# Patient Record
Sex: FEMALE | Race: WHITE | Employment: FULL TIME | URBAN - METROPOLITAN AREA
[De-identification: names, ages, dates, MRNs, and addresses within clinical notes are randomized per-mention and may not be internally consistent; named-entity substitution may affect disease eponyms.]

---

## 2017-01-25 RX ORDER — INSULIN ASPART 100 [IU]/ML
INJECTION, SOLUTION INTRAVENOUS; SUBCUTANEOUS
Qty: 2 PEN | Refills: 2 | Status: SHIPPED | OUTPATIENT
Start: 2017-01-25 | End: 2017-06-26 | Stop reason: ALTCHOICE

## 2017-01-25 RX ORDER — PEN NEEDLE, DIABETIC 30 GX3/16"
NEEDLE, DISPOSABLE MISCELLANEOUS
Qty: 2 PACKAGE | Refills: 1 | Status: SHIPPED | OUTPATIENT
Start: 2017-01-25 | End: 2017-06-26 | Stop reason: ALTCHOICE

## 2017-03-29 ENCOUNTER — TELEPHONE (OUTPATIENT)
Dept: FAMILY MEDICINE CLINIC | Age: 42
End: 2017-03-29

## 2017-03-29 NOTE — TELEPHONE ENCOUNTER
Patient is currently taking Novolog, as of April 1st Humalog or Humulin will be the preferred drug, per [facility]

## 2017-04-04 RX ORDER — INSULIN ASPART 100 [IU]/ML
INJECTION, SOLUTION INTRAVENOUS; SUBCUTANEOUS
Qty: 30 ML | Refills: 3 | Status: SHIPPED | OUTPATIENT
Start: 2017-04-04 | End: 2017-06-26 | Stop reason: ALTCHOICE

## 2017-04-12 RX ORDER — AMITRIPTYLINE HYDROCHLORIDE 25 MG/1
TABLET, FILM COATED ORAL
Qty: 30 TAB | Refills: 4 | Status: SHIPPED | OUTPATIENT
Start: 2017-04-12 | End: 2017-09-14 | Stop reason: SDUPTHER

## 2017-06-26 ENCOUNTER — OFFICE VISIT (OUTPATIENT)
Dept: FAMILY MEDICINE CLINIC | Age: 42
End: 2017-06-26

## 2017-06-26 VITALS
SYSTOLIC BLOOD PRESSURE: 124 MMHG | OXYGEN SATURATION: 99 % | TEMPERATURE: 98.1 F | WEIGHT: 195.2 LBS | RESPIRATION RATE: 28 BRPM | DIASTOLIC BLOOD PRESSURE: 86 MMHG | HEIGHT: 64 IN | HEART RATE: 85 BPM | BODY MASS INDEX: 33.32 KG/M2

## 2017-06-26 DIAGNOSIS — I42.9 CARDIOMYOPATHY, UNSPECIFIED TYPE (HCC): ICD-10-CM

## 2017-06-26 DIAGNOSIS — I10 ESSENTIAL HYPERTENSION, BENIGN: ICD-10-CM

## 2017-06-26 DIAGNOSIS — E10.9 TYPE 1 DIABETES MELLITUS WITHOUT COMPLICATION (HCC): ICD-10-CM

## 2017-06-26 DIAGNOSIS — E03.9 UNSPECIFIED HYPOTHYROIDISM: ICD-10-CM

## 2017-06-26 DIAGNOSIS — Z00.00 ANNUAL PHYSICAL EXAM: Primary | ICD-10-CM

## 2017-06-26 LAB
GLUCOSE POC: 163 MG/DL
HBA1C MFR BLD HPLC: 8.7 %

## 2017-06-26 RX ORDER — INSULIN LISPRO 100 [IU]/ML
INJECTION, SOLUTION INTRAVENOUS; SUBCUTANEOUS
Qty: 3 VIAL | Refills: 5
Start: 2017-06-26 | End: 2017-10-10 | Stop reason: SDUPTHER

## 2017-06-26 NOTE — PROGRESS NOTES
Subjective:   43 y.o. female for Well Woman Check. Her gyne and breast care is done elsewhere by her Ob-Gyne physician. Patient Active Problem List   Diagnosis Code    Type 1 diabetes mellitus (HCC) E10.9    Essential hypertension, benign I10    Unspecified hypothyroidism E03.9    Cardiomyopathy ([facility] I42.9    Gastric polyp K31.7     Patient Active Problem List    Diagnosis Date Noted    Gastric polyp 01/02/2015    Type 1 diabetes mellitus ([facility] 12/23/2011    Essential hypertension, benign 12/23/2011    Unspecified hypothyroidism 12/23/2011    Cardiomyopathy ([facility] 12/23/2011     Current Outpatient Prescriptions   Medication Sig Dispense Refill    [facility]       insulin lispro (HUMALOG) 100 unit/mL injection Inject 80 units via pump daily 3 Vial 5    amitriptyline (ELAVIL) 25 mg tablet TAKE ONE TABLET BY MOUTH NIGHTLY AT BEDTIME 30 Tab 4    ALPRAZolam (XANAX) 0.25 mg tablet Take 1 Tab by mouth two (2) times daily as needed for Anxiety. Max Daily Amount: 0.5 mg. 50 Tab 1    metoprolol (LOPRESSOR) 50 mg tablet Take  by mouth nightly.  pravastatin (PRAVACHOL) 40 mg tablet Take 40 mg by mouth nightly.  aspirin delayed-release 81 mg tablet Take  by mouth daily.  levothyroxine (SYNTHROID) 112 mcg tablet Take 125 mcg by mouth Daily (before breakfast).  norethindrone (JOLIE) 0.35 mg tablet Take  by mouth. No Known Allergies  Past Medical History:   Diagnosis Date    Cardiomyopathy ([facility] 12/23/2011    Diabetes ([facility]     uses insulin pump (has had 7-8 years ago)    Essential hypertension, benign 12/23/2011    Hypercholesterolemia     Hypertension     Ill-defined condition     Cardiomyopathy    [facility]     Last seizure 8 yrs.  ago    Type I (juvenile type) diabetes mellitus without mention of complication, not stated as uncontrolled 12/23/2011    Unspecified hypothyroidism 12/23/2011     Past Surgical History:   Procedure Laterality Date    HX  SECTION      HX GASTRECTOMY  1/2/15    Dr Pascale Green    HX ORTHOPAEDIC      Left foot    HX ORTHOPAEDIC      Lumbar disc     Family History   Problem Relation Age of Onset    Diabetes Mother     Hypertension Mother     Elevated Lipids Mother     Heart Disease Father     Cancer Maternal Grandmother      Social History   Substance Use Topics    Smoking status: Never Smoker    Smokeless tobacco: Never Used    Alcohol use No             Specific concerns today: check up,changing endocrinology    Review of Systems  Constitutional: negative  Eyes: negative  Ears, nose, mouth, throat, and face: negative  Respiratory: negative  Cardiovascular: negative  Gastrointestinal: negative  Genitourinary:negative  Musculoskeletal:negative    Objective:   Blood pressure 124/86, pulse 85, temperature 98.1 °F (36.7 °C), temperature source Oral, resp. rate 28, height 5' 4\" (1.626 m), weight 195 lb 3.2 oz (88.5 kg), SpO2 99 %.    Physical Examination:   General appearance - alert, well appearing, and in no distress  Mental status - alert, oriented to person, place, and time  Eyes - pupils equal and reactive, extraocular eye movements intact  Ears - bilateral TM's and external ear canals normal  Nose - normal and patent, no erythema, discharge or polyps  Mouth - mucous membranes moist, pharynx normal without lesions  Neck - supple, no significant adenopathy, carotids upstroke normal bilaterally, no bruits, thyroid exam: thyroid is normal in size without nodules or tenderness  Lymphatics - no palpable lymphadenopathy, no hepatosplenomegaly  Chest - clear to auscultation, no wheezes, rales or rhonchi, symmetric air entry  Heart - normal rate, regular rhythm, normal S1, S2, no murmurs, rubs, clicks or gallops  Abdomen - soft, nontender, nondistended, no masses or organomegaly  Neurological - alert, oriented, normal speech, no focal findings or movement disorder noted  Musculoskeletal - no joint tenderness, deformity or swelling  Extremities - peripheral pulses normal, no pedal edema, no clubbing or cyanosis  Skin - normal coloration and turgor, no rashes, no suspicious skin lesions noted     Assessment/Plan:     continue present plan, routine labs ordered, call if any problems  Joya was seen today for diabetes, hypertension and cholesterol problem. Diagnoses and all orders for this visit:    Annual physical exam    Type 1 diabetes mellitus without complication ([facility]  -     AMB POC HEMOGLOBIN A1C  -     LIPID PANEL  -     AMB POC GLUCOSE, QUANTITATIVE, BLOOD    Cardiomyopathy, unspecified type    Essential hypertension, benign  -     METABOLIC PANEL, COMPREHENSIVE    Unspecified hypothyroidism    Other orders  -     insulin lispro (HUMALOG) 100 unit/mL injection; Inject 80 units via pump daily      Follow-up Disposition:  Return in about 6 months (around 12/26/2017).

## 2017-06-26 NOTE — MR AVS SNAPSHOT
Visit Information Date & Time Provider Department Dept. Phone Encounter #  
 6/26/2017  9:00 AM Charan Car [phone] [id number] Follow-up Instructions Return in about 6 months (around 12/26/2017). Upcoming Health Maintenance Date Due  
 FOOT EXAM Q1 2/21/1985 MICROALBUMIN Q1 2/21/1985 PAP AKA CERVICAL CYTOLOGY 2/21/1996 EYE EXAM RETINAL OR DILATED Q1 4/8/2012 HEMOGLOBIN A1C Q6M 12/23/2015 LIPID PANEL Q1 6/23/2016 INFLUENZA AGE 9 TO ADULT 8/1/2017 COLONOSCOPY 10/29/2023 DTaP/Tdap/Td series (2 - Td) 6/26/2027 Allergies as of 6/26/2017  Review Complete On: 6/26/2017 By: Janay Paz No Known Allergies Current Immunizations  Never Reviewed Name Date Influenza Vaccine 11/1/2014 Pneumococcal Polysaccharide (PPSV-23) 1/5/2015 11:32 AM  
  
 Not reviewed this visit You Were Diagnosed With   
  
 Codes Comments Annual physical exam    -  Primary ICD-10-CM: Z00.00 ICD-9-CM: V70.0 Type 1 diabetes mellitus without complication (HCC)     NICO-89-MH: E10.9 ICD-9-CM: 250.01 Cardiomyopathy, unspecified type     ICD-10-CM: I42.9 ICD-9-CM: 425.4 Essential hypertension, benign     ICD-10-CM: I10 
ICD-9-CM: 401.1 Unspecified hypothyroidism     ICD-10-CM: E03.9 ICD-9-CM: 865. 9 Vitals BP Pulse Temp Resp Height(growth percentile) Weight(growth percentile) 124/86 (BP 1 Location: Left arm, BP Patient Position: Sitting) 85 98.1 °F (36.7 °C) (Oral) 28 5' 4\" (1.626 m) 195 lb 3.2 oz (88.5 kg) SpO2 BMI OB Status Smoking Status 99% 33.51 kg/m2 Having regular periods Never Smoker Vitals History BMI and BSA Data Body Mass Index Body Surface Area  
 33.51 kg/m 2 2 m 2 Preferred Pharmacy Pharmacy Name Phone [place] [id number] [facility]akes [phone] Your Updated Medication List  
  
   
 This list is accurate as of: 6/26/17  9:29 AM.  Always use your most recent med list.  
  
  
  
  
 ALPRAZolam 0.25 mg tablet Commonly known as:  [place] Take 1 Tab by mouth two (2) times daily as needed for Anxiety. Max Daily Amount: 0.5 mg.  
  
 amitriptyline 25 mg tablet Commonly known as:  ELAVIL TAKE ONE TABLET BY MOUTH NIGHTLY AT BEDTIME  
  
 aspirin delayed-release 81 mg tablet Take  by mouth daily. JOLIE 0.35 mg Tab Generic drug:  norethindrone Take  by mouth. insulin lispro 100 unit/mL injection Commonly known as:  HUMALOG Inject 80 units via pump daily IP [facility] INSULIN LISPRO (HUMALOG) FOR PUMP  
  
 metoprolol tartrate 50 mg tablet Commonly known as:  LOPRESSOR Take  by mouth nightly. pravastatin 40 mg tablet Commonly known as:  PRAVACHOL Take 40 mg by mouth nightly. SYNTHROID 112 mcg tablet Generic drug:  levothyroxine Take 125 mcg by mouth Daily (before breakfast). We Performed the Following AMB POC GLUCOSE, QUANTITATIVE, BLOOD [73661 CPT(R)] AMB POC HEMOGLOBIN A1C [54801 CPT(R)] LIPID PANEL [78338 CPT(R)] METABOLIC PANEL, COMPREHENSIVE [80177 CPT(R)] Follow-up Instructions Return in about 6 months (around 12/26/2017). Introducing [facility]! Dear Dawit Spangler: Thank you for requesting a [employer] account. Our records indicate that you already have an active Zaz[employer] account. You can access your account anytime at [email or web address] Did you know that you can access your hospital and ER discharge instructions at any time in Zazuba? You can also review all of your test results from your hospital stay or ER visit. Additional Information If you have questions, please visit the Frequently Asked Questions section of the Zazuba website at [email or web address] Remember, Zazuba is NOT to be used for urgent needs. For medical emergencies, dial 911. Now available from your iPhone and Android! Please provide this summary of care documentation to your next provider. Your primary care clinician is listed as Clarke Colon. If you have any questions after today's visit, please call [phone].

## 2017-06-27 LAB
ALBUMIN SERPL-MCNC: 4.1 G/DL (ref 3.5–5.5)
ALBUMIN/GLOB SERPL: 1.5 {RATIO} (ref 1.2–2.2)
ALP SERPL-CCNC: 123 IU/L (ref 39–117)
ALT SERPL-CCNC: 11 IU/L (ref 0–32)
AST SERPL-CCNC: 12 IU/L (ref 0–40)
BILIRUB SERPL-MCNC: 0.4 MG/DL (ref 0–1.2)
BUN SERPL-MCNC: 16 MG/DL (ref 6–24)
BUN/CREAT SERPL: 20 (ref 9–23)
CALCIUM SERPL-MCNC: 9 MG/DL (ref 8.7–10.2)
CHLORIDE SERPL-SCNC: 99 MMOL/L (ref 96–106)
CHOLEST SERPL-MCNC: 150 MG/DL (ref 100–199)
CO2 SERPL-SCNC: 24 MMOL/L (ref 18–29)
CREAT SERPL-MCNC: 0.82 MG/DL (ref 0.57–1)
GLOBULIN SER CALC-MCNC: 2.8 G/DL (ref 1.5–4.5)
GLUCOSE SERPL-MCNC: 152 MG/DL (ref 65–99)
HDLC SERPL-MCNC: 37 MG/DL
INTERPRETATION, 910389: NORMAL
LDLC SERPL CALC-MCNC: 87 MG/DL (ref 0–99)
POTASSIUM SERPL-SCNC: 4.3 MMOL/L (ref 3.5–5.2)
PROT SERPL-MCNC: 6.9 G/DL (ref 6–8.5)
SODIUM SERPL-SCNC: 139 MMOL/L (ref 134–144)
TRIGL SERPL-MCNC: 128 MG/DL (ref 0–149)
VLDLC SERPL CALC-MCNC: 26 MG/DL (ref 5–40)

## 2017-08-18 RX ORDER — AZITHROMYCIN 250 MG/1
TABLET, FILM COATED ORAL
Qty: 6 TAB | Refills: 0 | Status: SHIPPED | OUTPATIENT
Start: 2017-08-18 | End: 2017-08-23

## 2017-08-18 NOTE — TELEPHONE ENCOUNTER
----- Message from Whit Cole sent at 8/18/2017  7:52 AM EDT -----  Regarding: Dr Becky Libman  Pt would like to get a [employer] call in for a sinus infection, please [facility] into [facility] [phone], pt call be reach at [phone].

## 2017-09-14 RX ORDER — AMITRIPTYLINE HYDROCHLORIDE 25 MG/1
TABLET, FILM COATED ORAL
Qty: 30 TAB | Refills: 4 | Status: SHIPPED | OUTPATIENT
Start: 2017-09-14 | End: 2018-01-03 | Stop reason: SDUPTHER

## 2017-10-10 RX ORDER — INSULIN LISPRO 100 [IU]/ML
INJECTION, SOLUTION INTRAVENOUS; SUBCUTANEOUS
Qty: 30 ML | Refills: 3 | Status: SHIPPED | OUTPATIENT
Start: 2017-10-10 | End: 2018-04-07 | Stop reason: SDUPTHER

## 2017-10-11 RX ORDER — INSULIN LISPRO 100 [IU]/ML
INJECTION, SOLUTION INTRAVENOUS; SUBCUTANEOUS
Qty: 3 VIAL | Refills: 5
Start: 2017-10-11 | End: 2019-06-03 | Stop reason: SDUPTHER

## 2017-10-11 NOTE — TELEPHONE ENCOUNTER
From: Joya Beard  To: Marcus Jean Baptiste MD  Sent: 10/10/2017 4:14 PM EDT  Subject: Medication Renewal Request    Original authorizing provider: Marcus Jean Baptiste MD    Chen Demi would like a refill of the following medications:  insulin lispro (HUMALOG) 100 unit/mL injection Marcus Jean Baptiste MD]    Preferred pharmacy: [address]    Comment:  [facility] has sent a prescription request for Humalog four times and have not received a response. I am completely out of medicine and need this asap.

## 2018-01-03 RX ORDER — AMITRIPTYLINE HYDROCHLORIDE 25 MG/1
TABLET, FILM COATED ORAL
Qty: 90 TAB | Refills: 0 | Status: SHIPPED | OUTPATIENT
Start: 2018-01-03 | End: 2018-04-30 | Stop reason: SDUPTHER

## 2018-04-08 RX ORDER — INSULIN LISPRO 100 [IU]/ML
INJECTION, SOLUTION INTRAVENOUS; SUBCUTANEOUS
Qty: 90 ML | Refills: 1 | Status: SHIPPED | OUTPATIENT
Start: 2018-04-08 | End: 2018-09-17 | Stop reason: SDUPTHER

## 2018-04-30 RX ORDER — AMITRIPTYLINE HYDROCHLORIDE 25 MG/1
TABLET, FILM COATED ORAL
Qty: 90 TAB | Refills: 0 | Status: SHIPPED | OUTPATIENT
Start: 2018-04-30 | End: 2018-08-25 | Stop reason: SDUPTHER

## 2018-08-27 RX ORDER — AMITRIPTYLINE HYDROCHLORIDE 25 MG/1
TABLET, FILM COATED ORAL
Qty: 90 TAB | Refills: 0 | Status: SHIPPED | OUTPATIENT
Start: 2018-08-27 | End: 2019-03-25 | Stop reason: SDUPTHER

## 2018-09-17 RX ORDER — INSULIN LISPRO 100 [IU]/ML
INJECTION, SOLUTION INTRAVENOUS; SUBCUTANEOUS
Qty: 90 ML | Refills: 1 | Status: SHIPPED | OUTPATIENT
Start: 2018-09-17 | End: 2019-09-23 | Stop reason: SDUPTHER

## 2019-02-28 RX ORDER — AZITHROMYCIN 250 MG/1
TABLET, FILM COATED ORAL
Qty: 6 TAB | Refills: 0 | Status: SHIPPED | OUTPATIENT
Start: 2019-02-28 | End: 2019-03-05

## 2019-02-28 RX ORDER — PROMETHAZINE HYDROCHLORIDE AND DEXTROMETHORPHAN HYDROBROMIDE 6.25; 15 MG/5ML; MG/5ML
5 SYRUP ORAL
Qty: 180 ML | Refills: 0 | Status: SHIPPED | OUTPATIENT
Start: 2019-02-28 | End: 2019-03-07

## 2019-02-28 NOTE — TELEPHONE ENCOUNTER
Patient wants to be seen this week for a cough/ congestion/ dizziness.   Please give her a call @ [phone]

## 2019-03-21 NOTE — TELEPHONE ENCOUNTER
Last Visit: 11/22/17  Next Appt: none  Previous Refill Encounter: 8/27/18-90+0    Requested Prescriptions     Pending Prescriptions Disp Refills    amitriptyline (ELAVIL) 25 mg tablet 90 Tab 0     Sig: TAKE 1 TABLET BY MOUTH EVERY DAY AT BEDTIME

## 2019-03-22 RX ORDER — AMITRIPTYLINE HYDROCHLORIDE 25 MG/1
TABLET, FILM COATED ORAL
Qty: 90 TAB | Refills: 0 | OUTPATIENT
Start: 2019-03-22

## 2019-03-28 RX ORDER — AMITRIPTYLINE HYDROCHLORIDE 25 MG/1
TABLET, FILM COATED ORAL
Qty: 90 TAB | Refills: 0 | Status: SHIPPED | OUTPATIENT
Start: 2019-03-28 | End: 2019-06-19 | Stop reason: SDUPTHER

## 2019-06-03 RX ORDER — PRAVASTATIN SODIUM 40 MG/1
40 TABLET ORAL
Qty: 90 TAB | Refills: 1 | Status: SHIPPED | OUTPATIENT
Start: 2019-06-03 | End: 2019-12-14 | Stop reason: SDUPTHER

## 2019-06-03 RX ORDER — INSULIN LISPRO 100 [IU]/ML
INJECTION, SOLUTION INTRAVENOUS; SUBCUTANEOUS
Qty: 3 VIAL | Refills: 5
Start: 2019-06-03 | End: 2020-02-01 | Stop reason: SDUPTHER

## 2019-06-03 NOTE — TELEPHONE ENCOUNTER
Patient states that she needs a couple of RX refills HUMALOG U-100 INSULIN 100 unit/mL injection      pravastatin (PRAVACHOL) 40 mg tablet she can be reached @ [phone]

## 2019-06-19 RX ORDER — AMITRIPTYLINE HYDROCHLORIDE 25 MG/1
TABLET, FILM COATED ORAL
Qty: 90 TAB | Refills: 0 | Status: SHIPPED | OUTPATIENT
Start: 2019-06-19 | End: 2019-10-01 | Stop reason: SDUPTHER

## 2019-06-28 ENCOUNTER — HOSPITAL ENCOUNTER (EMERGENCY)
Age: 44
Discharge: HOME | End: 2019-06-28
Attending: EMERGENCY MEDICINE
Payer: SELF-PAY

## 2019-06-28 VITALS
WEIGHT: 199.08 LBS | TEMPERATURE: 97.7 F | RESPIRATION RATE: 17 BRPM | HEIGHT: 64 IN | DIASTOLIC BLOOD PRESSURE: 83 MMHG | BODY MASS INDEX: 33.99 KG/M2 | HEART RATE: 67 BPM | OXYGEN SATURATION: 96 % | SYSTOLIC BLOOD PRESSURE: 137 MMHG

## 2019-06-28 DIAGNOSIS — K29.90 GASTRITIS AND DUODENITIS: Primary | ICD-10-CM

## 2019-06-28 LAB
ALBUMIN SERPL-MCNC: 3.7 G/DL (ref 3.5–5)
ALBUMIN/GLOB SERPL: 0.9 {RATIO} (ref 1.1–2.2)
ALP SERPL-CCNC: 119 U/L (ref 45–117)
ALT SERPL-CCNC: 20 U/L (ref 12–78)
ANION GAP SERPL CALC-SCNC: 5 MMOL/L (ref 5–15)
AST SERPL-CCNC: 19 U/L (ref 15–37)
BASOPHILS # BLD: 0.1 K/UL (ref 0–0.1)
BASOPHILS NFR BLD: 1 % (ref 0–1)
BILIRUB SERPL-MCNC: 0.4 MG/DL (ref 0.2–1)
BUN SERPL-MCNC: 18 MG/DL (ref 6–20)
BUN/CREAT SERPL: 21 (ref 12–20)
CALCIUM SERPL-MCNC: 8.8 MG/DL (ref 8.5–10.1)
CHLORIDE SERPL-SCNC: 108 MMOL/L (ref 97–108)
CO2 SERPL-SCNC: 27 MMOL/L (ref 21–32)
CREAT SERPL-MCNC: 0.84 MG/DL (ref 0.55–1.02)
DIFFERENTIAL METHOD BLD: NORMAL
EOSINOPHIL # BLD: 0.3 K/UL (ref 0–0.4)
EOSINOPHIL NFR BLD: 4 % (ref 0–7)
ERYTHROCYTE [DISTWIDTH] IN BLOOD BY AUTOMATED COUNT: 12.8 % (ref 11.5–14.5)
GLOBULIN SER CALC-MCNC: 3.9 G/DL (ref 2–4)
GLUCOSE SERPL-MCNC: 77 MG/DL (ref 65–100)
HCT VFR BLD AUTO: 41.9 % (ref 35–47)
HGB BLD-MCNC: 14.1 G/DL (ref 11.5–16)
IMM GRANULOCYTES # BLD AUTO: 0 K/UL (ref 0–0.04)
IMM GRANULOCYTES NFR BLD AUTO: 0 % (ref 0–0.5)
LIPASE SERPL-CCNC: 296 U/L (ref 73–393)
LYMPHOCYTES # BLD: 3.2 K/UL (ref 0.8–3.5)
LYMPHOCYTES NFR BLD: 33 % (ref 12–49)
MCH RBC QN AUTO: 27.4 PG (ref 26–34)
MCHC RBC AUTO-ENTMCNC: 33.7 G/DL (ref 30–36.5)
MCV RBC AUTO: 81.4 FL (ref 80–99)
MONOCYTES # BLD: 0.7 K/UL (ref 0–1)
MONOCYTES NFR BLD: 7 % (ref 5–13)
NEUTS SEG # BLD: 5.4 K/UL (ref 1.8–8)
NEUTS SEG NFR BLD: 55 % (ref 32–75)
NRBC # BLD: 0 K/UL (ref 0–0.01)
NRBC BLD-RTO: 0 PER 100 WBC
PLATELET # BLD AUTO: 277 K/UL (ref 150–400)
PMV BLD AUTO: 9.5 FL (ref 8.9–12.9)
POTASSIUM SERPL-SCNC: 3.7 MMOL/L (ref 3.5–5.1)
PROT SERPL-MCNC: 7.6 G/DL (ref 6.4–8.2)
RBC # BLD AUTO: 5.15 M/UL (ref 3.8–5.2)
SODIUM SERPL-SCNC: 140 MMOL/L (ref 136–145)
WBC # BLD AUTO: 9.7 K/UL (ref 3.6–11)

## 2019-06-28 PROCEDURE — 74011250636 HC RX REV CODE- 250/636

## 2019-06-28 PROCEDURE — 85025 COMPLETE CBC W/AUTO DIFF WBC: CPT

## 2019-06-28 PROCEDURE — 74011250637 HC RX REV CODE- 250/637: Performed by: EMERGENCY MEDICINE

## 2019-06-28 PROCEDURE — 74011000250 HC RX REV CODE- 250: Performed by: EMERGENCY MEDICINE

## 2019-06-28 PROCEDURE — 80053 COMPREHEN METABOLIC PANEL: CPT

## 2019-06-28 PROCEDURE — 96374 THER/PROPH/DIAG INJ IV PUSH: CPT

## 2019-06-28 PROCEDURE — 83690 ASSAY OF LIPASE: CPT

## 2019-06-28 PROCEDURE — 36415 COLL VENOUS BLD VENIPUNCTURE: CPT

## 2019-06-28 PROCEDURE — 99283 EMERGENCY DEPT VISIT LOW MDM: CPT

## 2019-06-28 RX ORDER — FENTANYL CITRATE 50 UG/ML
50 INJECTION, SOLUTION INTRAMUSCULAR; INTRAVENOUS
Status: COMPLETED | OUTPATIENT
Start: 2019-06-28 | End: 2019-06-28

## 2019-06-28 RX ORDER — LIDOCAINE HYDROCHLORIDE 20 MG/ML
SOLUTION OROPHARYNGEAL
Status: DISCONTINUED
Start: 2019-06-28 | End: 2019-06-28 | Stop reason: HOSPADM

## 2019-06-28 RX ORDER — SUCRALFATE 1 G/1
1 TABLET ORAL 4 TIMES DAILY
Qty: 30 TAB | Refills: 0 | Status: SHIPPED | OUTPATIENT
Start: 2019-06-28 | End: 2019-09-23

## 2019-06-28 RX ORDER — OMEPRAZOLE 20 MG/1
20 CAPSULE, DELAYED RELEASE ORAL DAILY
Qty: 20 CAP | Refills: 0 | Status: SHIPPED | OUTPATIENT
Start: 2019-06-28 | End: 2019-09-23

## 2019-06-28 RX ORDER — FENTANYL CITRATE 50 UG/ML
INJECTION, SOLUTION INTRAMUSCULAR; INTRAVENOUS
Status: DISCONTINUED
Start: 2019-06-28 | End: 2019-06-28 | Stop reason: HOSPADM

## 2019-06-28 RX ADMIN — FENTANYL CITRATE 50 MCG: 50 INJECTION, SOLUTION INTRAMUSCULAR; INTRAVENOUS at 02:27

## 2019-06-28 RX ADMIN — LIDOCAINE HYDROCHLORIDE 40 ML: 20 SOLUTION ORAL; TOPICAL at 01:17

## 2019-06-28 NOTE — ED PROVIDER NOTES
EMERGENCY DEPARTMENT HISTORY AND PHYSICAL EXAM      Date: 6/28/2019  Patient Name: Miri Chino  Patient Age and Sex: 40 y.o. female     History of Presenting Illness     Chief Complaint   Patient presents with    Abdominal Pain     Pt presents to ED c/o abdominal pain, onset x 1 week ago. History Provided By: Patient    HPI: Miri Chino is a 72-year-old female with a history of partial gastrectomy presenting with epigastric pain. Patient states that for the past 2 days has been having sharp epigastric pain radiating to her back associated with nausea and belching. Denies any vomiting, diarrhea, constipation. Patient states that she has a history of polyp in her stomach and feels like it is that. States she also has a history of acid reflux but does not take anything because has not had issues with it for a long time. Pain is currently a 10 out of 10. There are no other complaints, changes, or physical findings at this time. PCP: Gonzalez Ulrich MD    No current facility-administered medications on file prior to encounter. Current Outpatient Medications on File Prior to Encounter   Medication Sig Dispense Refill    amitriptyline (ELAVIL) 25 mg tablet TAKE 1 TABLET BY MOUTH EVERYDAY AT BEDTIME 90 Tab 0    insulin lispro (HUMALOG) 100 unit/mL injection Inject 80 units via pump daily 3 Vial 5    pravastatin (PRAVACHOL) 40 mg tablet Take 1 Tab by mouth nightly. 90 Tab 1    HUMALOG U-100 INSULIN 100 unit/mL injection INJECT 80 UNITS VIA PUMP DAILY 90 mL 1    IP [facility] INSULIN LISPRO, HUMALOG, FOR PUMP       ALPRAZolam (XANAX) 0.25 mg tablet Take 1 Tab by mouth two (2) times daily as needed for Anxiety. Max Daily Amount: 0.5 mg. 50 Tab 1    metoprolol (LOPRESSOR) 50 mg tablet Take  by mouth nightly.  aspirin delayed-release 81 mg tablet Take  by mouth daily.  levothyroxine (SYNTHROID) 112 mcg tablet Take 125 mcg by mouth Daily (before breakfast).       norethindrone (JOLIE) 0.35 mg tablet Take  by mouth. Past History     Past Medical History:  Past Medical History:   Diagnosis Date    Cardiomyopathy ([address] 2011    Diabetes ([facility]     uses insulin pump (has had 7-8 years ago)    Essential hypertension, benign 2011    Hypercholesterolemia     Hypertension     Ill-defined condition     Cardiomyopathy    Seizures ([facility]     Last seizure 8 yrs. ago    Type I (juvenile type) diabetes mellitus without mention of complication, not stated as uncontrolled 2011    Unspecified hypothyroidism 2011       Past Surgical History:  Past Surgical History:   Procedure Laterality Date    HX  SECTION      HX GASTRECTOMY  1/2/15    Dr Timi Rabago      Left foot    HX ORTHOPAEDIC      Lumbar disc       Family History:  Family History   Problem Relation Age of Onset    Diabetes Mother     Hypertension Mother     Elevated Lipids Mother     Heart Disease Father     Cancer Maternal Grandmother        Social History:  Social History     Tobacco Use    Smoking status: Never Smoker    Smokeless tobacco: Never Used   Substance Use Topics    Alcohol use: No    Drug use: No       Allergies:  No Known Allergies      Review of Systems   Review of Systems   Constitutional: Negative for chills and fever. Respiratory: Negative for cough and shortness of breath. Cardiovascular: Negative for chest pain. Gastrointestinal: Positive for abdominal pain and nausea. Negative for constipation, diarrhea and vomiting. Neurological: Negative for weakness and numbness. All other systems reviewed and are negative. Physical Exam   Physical Exam   Constitutional: She is oriented to person, place, and time. She appears well-developed and well-nourished. HENT:   Head: Normocephalic and atraumatic. Eyes: Conjunctivae and EOM are normal.   Neck: Normal range of motion. Neck supple.    Cardiovascular: Normal rate and regular rhythm. Pulmonary/Chest: Effort normal and breath sounds normal. No respiratory distress. Abdominal: Soft. She exhibits no distension. There is tenderness. Epigastric tenderness   Musculoskeletal: Normal range of motion. Neurological: She is alert and oriented to person, place, and time. Skin: Skin is warm and dry. Psychiatric: She has a normal mood and affect. Nursing note and vitals reviewed. Diagnostic Study Results     Labs -     Recent Results (from the past 12 hour(s))   CBC WITH AUTOMATED DIFF    Collection Time: 06/28/19  1:15 AM   Result Value Ref Range    WBC 9.7 3.6 - 11.0 K/uL    RBC 5.15 3.80 - 5.20 M/uL    HGB 14.1 11.5 - 16.0 g/dL    HCT 41.9 35.0 - 47.0 %    MCV 81.4 80.0 - 99.0 FL    MCH 27.4 26.0 - 34.0 PG    MCHC 33.7 30.0 - 36.5 g/dL    RDW 12.8 11.5 - 14.5 %    PLATELET 450 256 - 119 K/uL    MPV 9.5 8.9 - 12.9 FL    NRBC 0.0 0  WBC    ABSOLUTE NRBC 0.00 0.00 - 0.01 K/uL    NEUTROPHILS 55 32 - 75 %    LYMPHOCYTES 33 12 - 49 %    MONOCYTES 7 5 - 13 %    EOSINOPHILS 4 0 - 7 %    BASOPHILS 1 0 - 1 %    IMMATURE GRANULOCYTES 0 0.0 - 0.5 %    ABS. NEUTROPHILS 5.4 1.8 - 8.0 K/UL    ABS. LYMPHOCYTES 3.2 0.8 - 3.5 K/UL    ABS. MONOCYTES 0.7 0.0 - 1.0 K/UL    ABS. EOSINOPHILS 0.3 0.0 - 0.4 K/UL    ABS. BASOPHILS 0.1 0.0 - 0.1 K/UL    ABS. IMM. GRANS. 0.0 0.00 - 0.04 K/UL    DF AUTOMATED     METABOLIC PANEL, COMPREHENSIVE    Collection Time: 06/28/19  1:15 AM   Result Value Ref Range    Sodium 140 136 - 145 mmol/L    Potassium 3.7 3.5 - 5.1 mmol/L    Chloride 108 97 - 108 mmol/L    CO2 27 21 - 32 mmol/L    Anion gap 5 5 - 15 mmol/L    Glucose 77 65 - 100 mg/dL    BUN 18 6 - 20 MG/DL    Creatinine 0.84 0.55 - 1.02 MG/DL    BUN/Creatinine ratio 21 (H) 12 - 20      GFR est AA >60 >60 ml/min/1.73m2    GFR est non-AA >60 >60 ml/min/1.73m2    Calcium 8.8 8.5 - 10.1 MG/DL    Bilirubin, total 0.4 0.2 - 1.0 MG/DL    ALT (SGPT) 20 12 - 78 U/L    AST (SGOT) 19 15 - 37 U/L    Alk. phosphatase 119 (H) 45 - 117 U/L    Protein, total 7.6 6.4 - 8.2 g/dL    Albumin 3.7 3.5 - 5.0 g/dL    Globulin 3.9 2.0 - 4.0 g/dL    A-G Ratio 0.9 (L) 1.1 - 2.2     LIPASE    Collection Time: 06/28/19  1:15 AM   Result Value Ref Range    Lipase 296 73 - 393 U/L       Radiologic Studies -   No orders to display     CT Results  (Last 48 hours)    None        CXR Results  (Last 48 hours)    None            Medical Decision Making   I am the first provider for this patient. I reviewed the vital signs, available nursing notes, past medical history, past surgical history, family history and social history. Vital Signs-Reviewed the patient's vital signs. Patient Vitals for the past 12 hrs:   Temp Pulse Resp BP SpO2   06/28/19 0247 -- 67 -- 137/83 97 %   06/28/19 0037 97.7 °F (36.5 °C) 73 17 (!) 173/100 100 %       Records Reviewed: Nursing Notes and Old Medical Records    Provider Notes (Medical Decision Making):   Patient presents with epigastric abdominal pain. Differential includes gastritis, pancreatitis cholelithiasis, cholecystitis, hepatitis, muscular strain, renal pathology, gastroenteritis. Less likely ACS. Will obtain labs and possibly US. Will give analgesics and antiemetics PRN. ED Course:   Initial assessment performed. The patients presenting problems have been discussed, and they are in agreement with the care plan formulated and outlined with them. I have encouraged them to ask questions as they arise throughout their visit. pt's pain better after GI cocktail. Took the pain off some she states. Critical Care Time:   0    Disposition:  Discharge Note:  The patient has been re-evaluated and is ready for discharge. Reviewed available results with patient. Counseled patient on diagnosis and care plan. Patient has expressed understanding, and all questions have been answered. Patient agrees with plan and agrees to follow up as recommended, or to return to the ED if their symptoms worsen. Discharge instructions have been provided and explained to the patient, along with reasons to return to the ED. PLAN:  Current Discharge Medication List      START taking these medications    Details   omeprazole (PRILOSEC) 20 mg capsule Take 1 Cap by mouth daily. Qty: 20 Cap, Refills: 0      sucralfate (CARAFATE) 1 gram tablet Take 1 Tab by mouth four (4) times daily. Qty: 30 Tab, Refills: 0           2. Follow-up Information     Follow up With Specialties Details Why Contact Anaya Bray MD Gastroenterology Schedule an appointment as soon as possible for a visit  [address] [place] [address] Dr [phone]          3. Return to ED if worse     Diagnosis     Clinical Impression:   1. Gastritis and duodenitis        Attestations:    Anu Choe M.D. Please note that this dictation was completed with [employer], the computer voice recognition software. Quite often unanticipated grammatical, syntax, homophones, and other interpretive errors are inadvertently transcribed by the computer software. Please disregard these errors. Please excuse any errors that have escaped final proofreading. Thank you.

## 2019-06-28 NOTE — ED NOTES
Patient discharged by Radha Adkins MD. Patient provided with discharge instructions Rx and instructions on follow up care. Patient out of ED ambulatory accompanied by .

## 2019-06-28 NOTE — ED TRIAGE NOTES
Pt presents to ED c/o abdominal pain x 1 week. Pain 10/10. Pt denies n/v/d. Pt denies CP, denies SOB. Pt has hx of polyps. PT AOX4 and ambulatory.  with pt.

## 2019-06-28 NOTE — DISCHARGE INSTRUCTIONS
Patient Education        Gastritis: Care Instructions  Your Care Instructions    Gastritis is a sore and upset stomach. It happens when something irritates the stomach lining. Many things can cause it. These include an infection such as the flu or something you ate or drank. Medicines or a sore on the lining of the stomach (ulcer) also can cause it. Your belly may bloat and ache. You may belch, vomit, and feel sick to your stomach. You should be able to relieve the problem by taking medicine. And it may help to change your diet. If gastritis lasts, your doctor may prescribe medicine. Follow-up care is a key part of your treatment and safety. Be sure to make and go to all appointments, and call your doctor if you are having problems. It's also a good idea to know your test results and keep a list of the medicines you take. How can you care for yourself at home? · If your doctor prescribed antibiotics, take them as directed. Do not stop taking them just because you feel better. You need to take the full course of antibiotics. · Be safe with medicines. If your doctor prescribed medicine to decrease stomach acid, take it as directed. Call your doctor if you think you are having a problem with your medicine. · Do not take any other medicine, including over-the-counter pain relievers, without talking to your doctor first.  · If your doctor recommends over-the-counter medicine to reduce stomach acid, such as Pepcid AC, Prilosec, Tagamet HB, or Zantac 75, follow the directions on the label. · Drink plenty of fluids (enough so that your urine is light yellow or clear like water) to prevent dehydration. Choose water and other caffeine-free clear liquids. If you have kidney, heart, or liver disease and have to limit fluids, talk with your doctor before you increase the amount of fluids you drink. · Limit how much alcohol you drink. · Avoid coffee, tea, cola drinks, chocolate, and other foods with caffeine.  They increase stomach acid. When should you call for help? Call 911 anytime you think you may need emergency care. For example, call if:    · You vomit blood or what looks like coffee grounds.     · You pass maroon or very bloody stools.    Call your doctor now or seek immediate medical care if:    · You start breathing fast and have not produced urine in the last 8 hours.     · You cannot keep fluids down.    Watch closely for changes in your health, and be sure to contact your doctor if:    · You do not get better as expected. Where can you learn more? Go to http://tony-vikash.info/. Enter [id number] in the search box to learn more about \"Gastritis: Care Instructions. \"  Current as of: March 27, 2018  Content Version: 11.9  © 2098-1853 [employer]. Care instructions adapted under license by [employer] (which disclaims liability or warranty for this information). If you have questions about a medical condition or this instruction, always ask your healthcare professional. [address] any warranty or liability for your use of this information.

## 2019-09-23 ENCOUNTER — OFFICE VISIT (OUTPATIENT)
Dept: FAMILY MEDICINE CLINIC | Age: 44
End: 2019-09-23

## 2019-09-23 VITALS
RESPIRATION RATE: 18 BRPM | OXYGEN SATURATION: 98 % | DIASTOLIC BLOOD PRESSURE: 86 MMHG | SYSTOLIC BLOOD PRESSURE: 136 MMHG | HEART RATE: 76 BPM | TEMPERATURE: 98.4 F | WEIGHT: 199.2 LBS | HEIGHT: 64 IN | BODY MASS INDEX: 34.01 KG/M2

## 2019-09-23 DIAGNOSIS — E03.9 HYPOTHYROIDISM, UNSPECIFIED TYPE: ICD-10-CM

## 2019-09-23 DIAGNOSIS — I42.9 CARDIOMYOPATHY, UNSPECIFIED TYPE (HCC): ICD-10-CM

## 2019-09-23 DIAGNOSIS — E10.9 TYPE 1 DIABETES MELLITUS WITHOUT COMPLICATION (HCC): Primary | ICD-10-CM

## 2019-09-23 DIAGNOSIS — I10 ESSENTIAL HYPERTENSION, BENIGN: ICD-10-CM

## 2019-09-23 LAB
BILIRUB UR QL STRIP: NEGATIVE
GLUCOSE POC: 175 MG/DL
GLUCOSE UR-MCNC: NORMAL MG/DL
HBA1C MFR BLD HPLC: 8.3 %
KETONES P FAST UR STRIP-MCNC: NEGATIVE MG/DL
PH UR STRIP: 6 [PH] (ref 4.6–8)
PROT UR QL STRIP: NEGATIVE
SP GR UR STRIP: 1.02 (ref 1–1.03)
UA UROBILINOGEN AMB POC: NORMAL (ref 0.2–1)
URINALYSIS CLARITY POC: CLEAR
URINALYSIS COLOR POC: YELLOW
URINE BLOOD POC: NORMAL
URINE LEUKOCYTES POC: NEGATIVE
URINE NITRITES POC: NEGATIVE

## 2019-09-23 RX ORDER — LEVOTHYROXINE SODIUM 112 UG/1
125 TABLET ORAL
Qty: 30 TAB | Refills: 11 | Status: SHIPPED | OUTPATIENT
Start: 2019-09-23 | End: 2020-06-04 | Stop reason: DRUGHIGH

## 2019-09-23 RX ORDER — METOPROLOL TARTRATE 50 MG/1
50 TABLET ORAL
Qty: 90 TAB | Refills: 3 | Status: SHIPPED | OUTPATIENT
Start: 2019-09-23 | End: 2020-12-18

## 2019-09-23 NOTE — PROGRESS NOTES
Subjective:   40 y.o. female for Well Woman Check. Her gyne and breast care is done elsewhere by her Ob-Gyne physician. Patient Active Problem List   Diagnosis Code    Type 1 diabetes mellitus ([facility] E10.9    Essential hypertension, benign I10    Unspecified hypothyroidism E03.9    Cardiomyopathy ([facility] I42.9    Gastric polyp K31.7     Patient Active Problem List    Diagnosis Date Noted    Gastric polyp 2015    Type 1 diabetes mellitus ([facility] 2011    Essential hypertension, benign 2011    Unspecified hypothyroidism 2011    Cardiomyopathy ([facility] 2011     Current Outpatient Medications   Medication Sig Dispense Refill    amitriptyline (ELAVIL) 25 mg tablet TAKE 1 TABLET BY MOUTH EVERYDAY AT BEDTIME 90 Tab 0    insulin lispro (HUMALOG) 100 unit/mL injection Inject 80 units via pump daily 3 Vial 5    pravastatin (PRAVACHOL) 40 mg tablet Take 1 Tab by mouth nightly. 90 Tab 1    IP [facility] INSULIN LISPRO, HUMALOG, FOR PUMP       metoprolol (LOPRESSOR) 50 mg tablet Take  by mouth nightly.  levothyroxine (SYNTHROID) 112 mcg tablet Take 125 mcg by mouth Daily (before breakfast).  norethindrone (JOLIE) 0.35 mg tablet Take  by mouth. No Known Allergies  Past Medical History:   Diagnosis Date    Cardiomyopathy ([facility] 2011    Congestive heart failure (HCC)     Diabetes ([facility]     uses insulin pump (has had 7-8 years ago)    Essential hypertension, benign 2011    Hypercholesterolemia     Hypertension     Ill-defined condition     Cardiomyopathy    Seizures ([facility]     Last seizure 8 yrs.  ago    Type I (juvenile type) diabetes mellitus without mention of complication, not stated as uncontrolled 2011    Unspecified hypothyroidism 2011     Past Surgical History:   Procedure Laterality Date    ABDOMEN SURGERY PROC UNLISTED      HX  SECTION      HX ORTHOPAEDIC      Left foot    HX ORTHOPAEDIC      Lumbar disc     Family History Problem Relation Age of Onset    Diabetes Mother     Hypertension Mother     Elevated Lipids Mother     Heart Disease Father     Cancer Maternal Grandmother      Social History     Tobacco Use    Smoking status: Never Smoker    Smokeless tobacco: Never Used   Substance Use Topics    Alcohol use: No             Specific concerns today: check up    Review of Systems  Constitutional: negative  Eyes: negative  Ears, nose, mouth, throat, and face: negative  Respiratory: negative  Cardiovascular: negative  Gastrointestinal: negative  Genitourinary:negative  Musculoskeletal:negative    Objective:   Blood pressure 136/86, pulse 76, temperature 98.4 °F (36.9 °C), temperature source Oral, resp. rate 18, height 5' 4\" (1.626 m), weight 199 lb 3.2 oz (90.4 kg), SpO2 98 %.    Physical Examination:   General appearance - alert, well appearing, and in no distress  Mental status - alert, oriented to person, place, and time  Eyes - pupils equal and reactive, extraocular eye movements intact  Ears - bilateral TM's and external ear canals normal  Nose - normal and patent, no erythema, discharge or polyps  Mouth - mucous membranes moist, pharynx normal without lesions  Neck - supple, no significant adenopathy, carotids upstroke normal bilaterally, no bruits, thyroid exam: thyroid is normal in size without nodules or tenderness  Chest - clear to auscultation, no wheezes, rales or rhonchi, symmetric air entry  Heart - normal rate, regular rhythm, normal S1, S2, no murmurs, rubs, clicks or gallops  Abdomen - soft, nontender, nondistended, no masses or organomegaly  Neurological - alert, oriented, normal speech, no focal findings or movement disorder noted  Musculoskeletal - no joint tenderness, deformity or swelling  Extremities - peripheral pulses normal, no pedal edema, no clubbing or cyanosis  Skin - normal coloration and turgor, no rashes, no suspicious skin lesions noted  Comprehensive Diabetic Foot Exam  was performed Assessment/Plan:     follow low fat diet, continue present plan, routine labs ordered  Diagnoses and all orders for this visit:    1. Type 1 diabetes mellitus without complication (HCC) inadequate control on insulin pump,refer to Diabetic Ed,new Endo  -     METABOLIC PANEL, COMPREHENSIVE  -     AMB POC URINALYSIS DIP STICK AUTO W/O MICRO  -     MICROALBUMIN, UR, RAND W/ MICROALB/CREAT RATIO  -     AMB POC GLUCOSE, QUANTITATIVE, BLOOD  -     AMB POC HEMOGLOBIN A1C  -     CHOLESTEROL, TOTAL    2. Cardiomyopathy, unspecified type ([address]    3. Essential hypertension, benign  -     metoprolol tartrate (LOPRESSOR) 50 mg tablet; Take 1 Tab by mouth nightly. -     METABOLIC PANEL, COMPREHENSIVE  -     CBC WITH AUTOMATED DIFF  -     AMB POC URINALYSIS DIP STICK AUTO W/O MICRO    4. Hypothyroidism, unspecified type  -     TSH 3RD GENERATION  -     levothyroxine (SYNTHROID) 112 mcg tablet; Take 1 Tab by mouth Daily (before breakfast). Follow-up and Dispositions    · Return in about 4 weeks (around 10/21/2019).

## 2019-09-24 LAB
ALBUMIN SERPL-MCNC: 4.4 G/DL (ref 3.5–5.5)
ALBUMIN/CREAT UR: 11.6 MG/G CREAT (ref 0–30)
ALBUMIN/GLOB SERPL: 1.7 {RATIO} (ref 1.2–2.2)
ALP SERPL-CCNC: 113 IU/L (ref 39–117)
ALT SERPL-CCNC: 10 IU/L (ref 0–32)
AST SERPL-CCNC: 14 IU/L (ref 0–40)
BASOPHILS # BLD AUTO: 0.1 X10E3/UL (ref 0–0.2)
BASOPHILS NFR BLD AUTO: 1 %
BILIRUB SERPL-MCNC: 0.3 MG/DL (ref 0–1.2)
BUN SERPL-MCNC: 14 MG/DL (ref 6–24)
BUN/CREAT SERPL: 22 (ref 9–23)
CALCIUM SERPL-MCNC: 9.4 MG/DL (ref 8.7–10.2)
CHLORIDE SERPL-SCNC: 101 MMOL/L (ref 96–106)
CO2 SERPL-SCNC: 23 MMOL/L (ref 20–29)
CREAT SERPL-MCNC: 0.65 MG/DL (ref 0.57–1)
CREAT UR-MCNC: 128.9 MG/DL
EOSINOPHIL # BLD AUTO: 0.4 X10E3/UL (ref 0–0.4)
EOSINOPHIL NFR BLD AUTO: 5 %
ERYTHROCYTE [DISTWIDTH] IN BLOOD BY AUTOMATED COUNT: 13.1 % (ref 12.3–15.4)
GLOBULIN SER CALC-MCNC: 2.6 G/DL (ref 1.5–4.5)
GLUCOSE SERPL-MCNC: 167 MG/DL (ref 65–99)
HCT VFR BLD AUTO: 40.3 % (ref 34–46.6)
HGB BLD-MCNC: 13.6 G/DL (ref 11.1–15.9)
IMM GRANULOCYTES # BLD AUTO: 0 X10E3/UL (ref 0–0.1)
IMM GRANULOCYTES NFR BLD AUTO: 0 %
LYMPHOCYTES # BLD AUTO: 2.3 X10E3/UL (ref 0.7–3.1)
LYMPHOCYTES NFR BLD AUTO: 28 %
MCH RBC QN AUTO: 27.5 PG (ref 26.6–33)
MCHC RBC AUTO-ENTMCNC: 33.7 G/DL (ref 31.5–35.7)
MCV RBC AUTO: 82 FL (ref 79–97)
MICROALBUMIN UR-MCNC: 14.9 UG/ML
MONOCYTES # BLD AUTO: 0.5 X10E3/UL (ref 0.1–0.9)
MONOCYTES NFR BLD AUTO: 6 %
NEUTROPHILS # BLD AUTO: 4.9 X10E3/UL (ref 1.4–7)
NEUTROPHILS NFR BLD AUTO: 60 %
PLATELET # BLD AUTO: 305 X10E3/UL (ref 150–450)
POTASSIUM SERPL-SCNC: 4.3 MMOL/L (ref 3.5–5.2)
PROT SERPL-MCNC: 7 G/DL (ref 6–8.5)
RBC # BLD AUTO: 4.94 X10E6/UL (ref 3.77–5.28)
SODIUM SERPL-SCNC: 139 MMOL/L (ref 134–144)
TSH SERPL DL<=0.005 MIU/L-ACNC: 5.55 UIU/ML (ref 0.45–4.5)
WBC # BLD AUTO: 8.2 X10E3/UL (ref 3.4–10.8)

## 2019-09-25 LAB — CHOLEST SERPL-MCNC: 164 MG/DL (ref 100–199)

## 2019-09-25 NOTE — TELEPHONE ENCOUNTER
Patient would like for  know that she has a dry cough,pressure in (l) ear and sore chest can he give her a call @ [phone]

## 2019-09-26 RX ORDER — PROMETHAZINE HYDROCHLORIDE AND DEXTROMETHORPHAN HYDROBROMIDE 6.25; 15 MG/5ML; MG/5ML
5 SYRUP ORAL
Qty: 180 ML | Refills: 1 | Status: SHIPPED | OUTPATIENT
Start: 2019-09-26 | End: 2019-10-03

## 2019-09-26 RX ORDER — AZITHROMYCIN 250 MG/1
TABLET, FILM COATED ORAL
Qty: 6 TAB | Refills: 0 | Status: SHIPPED | OUTPATIENT
Start: 2019-09-26 | End: 2019-10-01

## 2019-12-16 RX ORDER — PRAVASTATIN SODIUM 40 MG/1
TABLET ORAL
Qty: 90 TAB | Refills: 1 | Status: SHIPPED | OUTPATIENT
Start: 2019-12-16 | End: 2021-02-12

## 2020-02-03 RX ORDER — INSULIN LISPRO 100 [IU]/ML
INJECTION, SOLUTION INTRAVENOUS; SUBCUTANEOUS
Qty: 3 VIAL | Refills: 5
Start: 2020-02-03 | End: 2020-03-14 | Stop reason: SDUPTHER

## 2020-03-12 RX ORDER — AZITHROMYCIN 250 MG/1
TABLET, FILM COATED ORAL
Qty: 6 TAB | Refills: 0 | Status: SHIPPED | OUTPATIENT
Start: 2020-03-12 | End: 2020-03-17

## 2020-03-12 NOTE — TELEPHONE ENCOUNTER
Patient wants to get something called in for a sinus infection.   Please give her a call @ [phone]

## 2020-03-16 RX ORDER — INSULIN LISPRO 100 [IU]/ML
INJECTION, SOLUTION INTRAVENOUS; SUBCUTANEOUS
Qty: 3 VIAL | Refills: 0
Start: 2020-03-16 | End: 2020-04-01 | Stop reason: SDUPTHER

## 2020-04-01 RX ORDER — INSULIN LISPRO 100 [IU]/ML
INJECTION, SOLUTION INTRAVENOUS; SUBCUTANEOUS
Qty: 9 VIAL | Refills: 0 | Status: SHIPPED | OUTPATIENT
Start: 2020-04-01 | End: 2020-07-13

## 2020-04-01 RX ORDER — INSULIN LISPRO 100 [IU]/ML
INJECTION, SOLUTION INTRAVENOUS; SUBCUTANEOUS
Qty: 9 VIAL | Refills: 1 | Status: CANCELLED
Start: 2020-04-01

## 2020-04-01 NOTE — TELEPHONE ENCOUNTER
Patient called and asking for a 90 day supply on humalog. States 90 day supply is cheaper.            Last visit:10/23/19  Next visit:not scheduled  Previous refill not scheduled     Requested Prescriptions     Pending Prescriptions Disp Refills    insulin lispro (HUMALOG) 100 unit/mL injection 9 Vial 1     Sig: Inject 80 units via pump daily

## 2020-05-08 ENCOUNTER — VIRTUAL VISIT (OUTPATIENT)
Dept: FAMILY MEDICINE CLINIC | Age: 45
End: 2020-05-08

## 2020-05-08 DIAGNOSIS — I10 ESSENTIAL HYPERTENSION, BENIGN: ICD-10-CM

## 2020-05-08 DIAGNOSIS — I42.9 CARDIOMYOPATHY, UNSPECIFIED TYPE (HCC): ICD-10-CM

## 2020-05-08 DIAGNOSIS — E10.9 TYPE 1 DIABETES MELLITUS WITHOUT COMPLICATION (HCC): Primary | ICD-10-CM

## 2020-05-08 DIAGNOSIS — E03.9 HYPOTHYROIDISM, UNSPECIFIED TYPE: ICD-10-CM

## 2020-05-08 RX ORDER — ACETAMINOPHEN AND CODEINE PHOSPHATE 120; 12 MG/5ML; MG/5ML
SOLUTION ORAL
COMMUNITY

## 2020-05-08 NOTE — PROGRESS NOTES
Chief Complaint   Patient presents with    Letter for School/Work     Pt requestng note to work from home. 1. Have you been to the ER, urgent care clinic since your last visit? Hospitalized since your last visit? No  2. Have you seen or consulted any other health care providers outside of the [address] [place] [address] since your last visit? Include any pap smears or colon screening.  No

## 2020-05-08 NOTE — LETTER
NOTIFICATION OF RETURN TO WORK / SCHOOL 
 
5/8/2020 9:52 AM 
 
Ms. Joya Beard [address] Darcie Goodman To Whom It May Concern: 
 
Joya Beard was under the care of [facility] from 2001. Due to her chronic medical conditions that place her at high risk for bad outcomes should she contract Covid 19,I am requeating that she continue working from home for another month She will be able to return to work/school on 6/9/20 without restrictions If there are questions or concerns please have the patient contact our office. Sincerely, Jenn Akbar MD

## 2020-05-08 NOTE — PROGRESS NOTES
HISTORY OF PRESENT ILLNESS  Patient encounter by synchronous (real time) audio-visual technology which is patient initiated. The Patient is aware that this encounter is a billable service with coverage determined by their insurance carrier,as discussed at the time of check in. The patient has given verbal consent to proceed    Sophy Sheikh is a 39 y.o. female. .Virtual Visit to f/u. IDDM on pump,HBP,CM,Hypoythyroidism. Doing well,Sheltering at home appropriately during the Covid crisis. Sugars are erratic    Diabetes   The history is provided by the patient. This is a chronic problem. The problem occurs daily. The problem has not changed since onset. Pertinent negatives include no chest pain, no abdominal pain, no headaches and no shortness of breath. Hypertension    The history is provided by the patient. This is a chronic problem. The problem has not changed since onset. Associated symptoms include anxiety. Pertinent negatives include no chest pain, no orthopnea, no palpitations, no blurred vision, no headaches, no peripheral edema and no shortness of breath. Anxiety   The history is provided by the patient. This is a recurrent problem. The problem occurs every several days. The problem has been gradually worsening. Pertinent negatives include no chest pain, no abdominal pain, no headaches and no shortness of breath. Letter for School/Work   The history is provided by the patient. This is a new problem. The problem occurs daily. Pertinent negatives include no chest pain, no abdominal pain, no headaches and no shortness of breath. Thyroid Problem   The history is provided by the patient. This is a chronic problem. The problem occurs daily. Pertinent negatives include no chest pain, no abdominal pain, no headaches and no shortness of breath.      Patient Active Problem List   Diagnosis Code    Type 1 diabetes mellitus ([facility] E10.9    Essential hypertension, benign I10    Hypothyroidism E03.9    Cardiomyopathy ([facility] I42.9    Gastric polyp K31.7      Current Outpatient Medications   Medication Sig Dispense Refill    norethindrone (Billie) 0.35 mg tab Billie 0.35 mg tablet   TAKE ONE TABLET BY MOUTH AT THE SAME TIME DAILY      insulin lispro (HUMALOG) 100 unit/mL injection Inject 80 units via pump daily 9 Vial 0    pravastatin (PRAVACHOL) 40 mg tablet TAKE 1 TABLET BY MOUTH EVERY DAY AT NIGHT 90 Tab 1    amitriptyline (ELAVIL) 25 mg tablet TAKE 1 TABLET BY MOUTH EVERYDAY AT BEDTIME 90 Tab 1    metoprolol tartrate (LOPRESSOR) 50 mg tablet Take 1 Tab by mouth nightly. 90 Tab 3    levothyroxine (SYNTHROID) 112 mcg tablet Take 1 Tab by mouth Daily (before breakfast). 30 Tab 11    IP [facility] INSULIN LISPRO, HUMALOG, FOR PUMP       norethindrone (JOLIE) 0.35 mg tablet Take  by mouth. No Known Allergies   Past Medical History:   Diagnosis Date    Cardiomyopathy ([facility] 12/23/2011    Congestive heart failure (HCC)     Diabetes ([facility]     uses insulin pump (has had 7-8 years ago)    Essential hypertension, benign 12/23/2011    Hypercholesterolemia     Hypertension     Ill-defined condition     Cardiomyopathy    Seizures ([facility]     Last seizure 8 yrs. ago    Type I (juvenile type) diabetes mellitus without mention of complication, not stated as uncontrolled 12/23/2011    Unspecified hypothyroidism 12/23/2011         Review of Systems   Constitutional: Negative for fever. HENT: Negative for hearing loss. Eyes: Negative for blurred vision. Respiratory: Negative for cough and shortness of breath. Cardiovascular: Negative for chest pain, palpitations, orthopnea and leg swelling. Gastrointestinal: Negative for abdominal pain, blood in stool and constipation. Genitourinary: Negative for dysuria, frequency and urgency. Skin: Negative for rash. Neurological: Negative for headaches.      Physical Exam:\  Appearance: no distress,alert cooperative  HEENT:no abnormalities visible  Chest: unlabored respirations  Skin: no pallor or cyanosis  Neuro:alert oriented,grossly intact      ASSESSMENT and PLAN  Diagnoses and all orders for this visit:    1. Type 1 diabetes mellitus without complication ([facility]    2. Essential hypertension, benign    3. Cardiomyopathy, unspecified type ([facility]    4. Hypothyroidism, unspecified type    rtc 2 mo  Due to this being a telehealth encounter (During  [address] [facility]),evaluation of the following organ systems was limited:Vitals/Constitutional/EENT,Respiratory,CV,GI,,MS,Neuro,Skin /Heme-Lymph-Imm  Pursuant to the emergency declaration under the [address] [place] [address] waiver authority and the [facility] Appropriations Act,this Virtual Visit was conducted ,with patient consent,to reduce the patients risk of exposure to Covid 19 and to provide continuity of care  for an established patient. Services were provided through a video synchronous discussion virtually to substitute for in person appointment. This visit was completed using Doxy. me    Time in Virtual Visit: 15   minutes

## 2020-05-29 LAB
ALBUMIN SERPL-MCNC: 4.1 G/DL (ref 3.8–4.8)
ALBUMIN/GLOB SERPL: 1.6 {RATIO} (ref 1.2–2.2)
ALP SERPL-CCNC: 132 IU/L (ref 39–117)
ALT SERPL-CCNC: 15 IU/L (ref 0–32)
AST SERPL-CCNC: 15 IU/L (ref 0–40)
BASOPHILS # BLD AUTO: 0.1 X10E3/UL (ref 0–0.2)
BASOPHILS NFR BLD AUTO: 1 %
BILIRUB SERPL-MCNC: 0.7 MG/DL (ref 0–1.2)
BUN SERPL-MCNC: 14 MG/DL (ref 6–24)
BUN/CREAT SERPL: 15 (ref 9–23)
CALCIUM SERPL-MCNC: 9.2 MG/DL (ref 8.7–10.2)
CHLORIDE SERPL-SCNC: 100 MMOL/L (ref 96–106)
CHOLEST SERPL-MCNC: 174 MG/DL (ref 100–199)
CO2 SERPL-SCNC: 23 MMOL/L (ref 20–29)
CREAT SERPL-MCNC: 0.94 MG/DL (ref 0.57–1)
EOSINOPHIL # BLD AUTO: 0.3 X10E3/UL (ref 0–0.4)
EOSINOPHIL NFR BLD AUTO: 3 %
ERYTHROCYTE [DISTWIDTH] IN BLOOD BY AUTOMATED COUNT: 13 % (ref 11.7–15.4)
EST. AVERAGE GLUCOSE BLD GHB EST-MCNC: 220 MG/DL
GLOBULIN SER CALC-MCNC: 2.5 G/DL (ref 1.5–4.5)
GLUCOSE SERPL-MCNC: 190 MG/DL (ref 65–99)
HBA1C MFR BLD: 9.3 % (ref 4.8–5.6)
HCT VFR BLD AUTO: 41.7 % (ref 34–46.6)
HDLC SERPL-MCNC: 39 MG/DL
HGB BLD-MCNC: 13.6 G/DL (ref 11.1–15.9)
IMM GRANULOCYTES # BLD AUTO: 0 X10E3/UL (ref 0–0.1)
IMM GRANULOCYTES NFR BLD AUTO: 0 %
INTERPRETATION, 910389: NORMAL
LDLC SERPL CALC-MCNC: 110 MG/DL (ref 0–99)
LYMPHOCYTES # BLD AUTO: 1.9 X10E3/UL (ref 0.7–3.1)
LYMPHOCYTES NFR BLD AUTO: 20 %
Lab: NORMAL
MCH RBC QN AUTO: 26.9 PG (ref 26.6–33)
MCHC RBC AUTO-ENTMCNC: 32.6 G/DL (ref 31.5–35.7)
MCV RBC AUTO: 83 FL (ref 79–97)
MONOCYTES # BLD AUTO: 0.7 X10E3/UL (ref 0.1–0.9)
MONOCYTES NFR BLD AUTO: 8 %
NEUTROPHILS # BLD AUTO: 6.3 X10E3/UL (ref 1.4–7)
NEUTROPHILS NFR BLD AUTO: 68 %
PLATELET # BLD AUTO: 298 X10E3/UL (ref 150–450)
POTASSIUM SERPL-SCNC: 4.8 MMOL/L (ref 3.5–5.2)
PROT SERPL-MCNC: 6.6 G/DL (ref 6–8.5)
RBC # BLD AUTO: 5.05 X10E6/UL (ref 3.77–5.28)
SODIUM SERPL-SCNC: 139 MMOL/L (ref 134–144)
TRIGL SERPL-MCNC: 126 MG/DL (ref 0–149)
TSH SERPL DL<=0.005 MIU/L-ACNC: 9.22 UIU/ML (ref 0.45–4.5)
VLDLC SERPL CALC-MCNC: 25 MG/DL (ref 5–40)
WBC # BLD AUTO: 9.2 X10E3/UL (ref 3.4–10.8)

## 2020-06-04 DIAGNOSIS — E03.9 HYPOTHYROIDISM, UNSPECIFIED TYPE: Primary | ICD-10-CM

## 2020-06-04 RX ORDER — LEVOTHYROXINE SODIUM 137 UG/1
137 TABLET ORAL
Qty: 90 TAB | Refills: 1 | Status: SHIPPED | OUTPATIENT
Start: 2020-06-04

## 2020-07-13 RX ORDER — INSULIN LISPRO 100 [IU]/ML
INJECTION, SOLUTION INTRAVENOUS; SUBCUTANEOUS
Qty: 30 ML | Refills: 0 | Status: SHIPPED | OUTPATIENT
Start: 2020-07-13 | End: 2021-05-31

## 2020-08-10 ENCOUNTER — TELEPHONE (OUTPATIENT)
Dept: FAMILY MEDICINE CLINIC | Age: 45
End: 2020-08-10

## 2020-08-10 RX ORDER — INSULIN LISPRO 100 [IU]/ML
INJECTION, SOLUTION INTRAVENOUS; SUBCUTANEOUS
Qty: 10 VIAL | Refills: 0
Start: 2020-08-10

## 2020-08-10 NOTE — TELEPHONE ENCOUNTER
----- Message from Angel Omer sent at 8/10/2020  9:33 AM EDT -----  Regarding: Dr. Jones Chino refill  Contact: [phone]  Caller's first and last name:  Reason for call: Pt states her  \"Humalog 80 units\" medication  [facility] However, she states the correct dosage for \"Humalog medication is between 88-96 units\" and is requesting for that to be called into her pharmacy.   Callback required yes/no and why: no  Best contact number(s): [phone]  Details to clarify the request: pt is out of medication

## 2020-08-12 ENCOUNTER — TELEPHONE (OUTPATIENT)
Dept: FAMILY MEDICINE CLINIC | Age: 45
End: 2020-08-12

## 2020-08-12 NOTE — TELEPHONE ENCOUNTER
MG    Michael Hernandez  Female, 39 y.o., 1975  Weight:   199 lb 3.2 oz (90.4 kg)  MRN:   [id number]  Phone:   [phone] [facility])  PCP:   Jada Mae MD  Primary Cvg:   [employer]: LEIDY CASTANEDA  Last Appt With Me  None  Next Appt With Me  None  Next Appt  None  Prescription Question     You  Joya Beard Just now (8:12 AM)          I called this prescription to [facility] on [address] yesterday at 3:30pm, I will give this message to Dr. Marty Lugo. This MyChart message has not been read. Rosalia Easton MD 15 hours ago (4:53 PM)          I have called your office 6 times in the last two days to get Sam Franklin or Dr Marty Lugo on the phone concerning my blood sugars. I also attempted to get an appointment which to no surprise there were none available. I completely ran out of insulin at 2:00 pm today which I use an insulin pump for. So my sugars are over 600 currently and I have been told three times that my prescription was sent in to [facility] and they have not received anything from you. I have been a patient at [facility] for almost 20 years and never had so many issues getting medication, appointments or to simply speak to the doctor. I will make this gets reported.             Encounter Messages     Read  Composed  From  To   Subject    N  8/12/2020  8:12 AM  Veronica Moody LPN  Michael Hernandez   RE: Prescription Question    Y  8/11/2020  4:53 PM  Velna Hammans, Jodeane Smoke, MD   Prescription Question

## 2020-08-12 NOTE — TELEPHONE ENCOUNTER
Sent via my chart, I called her insulin to [facility]. Telephone number [phone]

## 2020-08-24 RX ORDER — AMITRIPTYLINE HYDROCHLORIDE 25 MG/1
TABLET, FILM COATED ORAL
Qty: 90 TAB | Refills: 1 | Status: SHIPPED | OUTPATIENT
Start: 2020-08-24 | End: 2020-08-28 | Stop reason: SDUPTHER

## 2020-08-31 RX ORDER — AMITRIPTYLINE HYDROCHLORIDE 25 MG/1
TABLET, FILM COATED ORAL
Qty: 90 TAB | Refills: 1 | Status: SHIPPED | OUTPATIENT
Start: 2020-08-31 | End: 2020-09-03 | Stop reason: SDUPTHER

## 2020-09-03 RX ORDER — AMITRIPTYLINE HYDROCHLORIDE 25 MG/1
TABLET, FILM COATED ORAL
Qty: 90 TAB | Refills: 1 | Status: SHIPPED | OUTPATIENT
Start: 2020-09-03 | End: 2021-08-18 | Stop reason: SDUPTHER

## 2020-09-03 NOTE — TELEPHONE ENCOUNTER
Pt would like to speak to Dr Saray Kilgore        amitriptyline (ELAVIL) 25 mg tablet    [facility] [place] [address] [facility]       Best number to reach her is [phone]

## 2020-12-18 DIAGNOSIS — I10 ESSENTIAL HYPERTENSION, BENIGN: ICD-10-CM

## 2020-12-18 RX ORDER — METOPROLOL TARTRATE 50 MG/1
TABLET ORAL
Qty: 90 TAB | Refills: 0 | Status: SHIPPED | OUTPATIENT
Start: 2020-12-18 | End: 2021-05-27

## 2021-02-12 RX ORDER — PRAVASTATIN SODIUM 40 MG/1
TABLET ORAL
Qty: 90 TAB | Refills: 1 | Status: SHIPPED | OUTPATIENT
Start: 2021-02-12 | End: 2021-08-29

## 2021-04-06 ENCOUNTER — IMMUNIZATION (OUTPATIENT)
Dept: INTERNAL MEDICINE CLINIC | Age: 46
End: 2021-04-06
Payer: COMMERCIAL

## 2021-04-06 DIAGNOSIS — Z23 ENCOUNTER FOR IMMUNIZATION: Primary | ICD-10-CM

## 2021-04-06 PROCEDURE — 91300 COVID-19, MRNA, LNP-S, PF, 30MCG/0.3ML DOSE(PFIZER): CPT | Performed by: FAMILY MEDICINE

## 2021-04-06 PROCEDURE — 0001A COVID-19, MRNA, LNP-S, PF, 30MCG/0.3ML DOSE(PFIZER): CPT | Performed by: FAMILY MEDICINE

## 2021-04-27 ENCOUNTER — IMMUNIZATION (OUTPATIENT)
Dept: INTERNAL MEDICINE CLINIC | Age: 46
End: 2021-04-27
Payer: COMMERCIAL

## 2021-04-27 DIAGNOSIS — Z23 ENCOUNTER FOR IMMUNIZATION: Primary | ICD-10-CM

## 2021-04-27 PROCEDURE — 0002A COVID-19, MRNA, LNP-S, PF, 30MCG/0.3ML DOSE(PFIZER): CPT | Performed by: FAMILY MEDICINE

## 2021-04-27 PROCEDURE — 91300 COVID-19, MRNA, LNP-S, PF, 30MCG/0.3ML DOSE(PFIZER): CPT | Performed by: FAMILY MEDICINE

## 2021-05-27 DIAGNOSIS — I10 ESSENTIAL HYPERTENSION, BENIGN: ICD-10-CM

## 2021-05-27 RX ORDER — METOPROLOL TARTRATE 50 MG/1
TABLET ORAL
Qty: 90 TABLET | Refills: 0 | Status: SHIPPED | OUTPATIENT
Start: 2021-05-27 | End: 2022-09-05 | Stop reason: SDUPTHER

## 2021-06-16 DIAGNOSIS — E10.9 TYPE 1 DIABETES MELLITUS WITHOUT COMPLICATION (HCC): Primary | ICD-10-CM

## 2021-06-16 RX ORDER — IBUPROFEN 200 MG
CAPSULE ORAL
Qty: 150 STRIP | Refills: 11 | Status: SHIPPED | OUTPATIENT
Start: 2021-06-16 | End: 2021-06-18 | Stop reason: ALTCHOICE

## 2021-06-16 RX ORDER — INSULIN PUMP SYRINGE, 3 ML
EACH MISCELLANEOUS
Qty: 1 KIT | Refills: 0 | Status: SHIPPED | OUTPATIENT
Start: 2021-06-16 | End: 2021-08-03

## 2021-06-18 ENCOUNTER — TELEPHONE (OUTPATIENT)
Dept: FAMILY MEDICINE CLINIC | Age: 46
End: 2021-06-18

## 2021-06-18 DIAGNOSIS — E10.9 TYPE 1 DIABETES MELLITUS WITHOUT COMPLICATION (HCC): Primary | ICD-10-CM

## 2021-06-18 RX ORDER — IBUPROFEN 200 MG
CAPSULE ORAL
Qty: 150 STRIP | Refills: 11 | Status: SHIPPED | OUTPATIENT
Start: 2021-06-18 | End: 2022-06-22

## 2021-06-18 NOTE — TELEPHONE ENCOUNTER
[facility] sent fax stating that patient's script for blood glucose strips requires a sig code of 3 x daily.        Thanks,  [employer]

## 2021-06-21 DIAGNOSIS — E10.9 TYPE 1 DIABETES MELLITUS WITHOUT COMPLICATION (HCC): Primary | ICD-10-CM

## 2021-06-21 NOTE — TELEPHONE ENCOUNTER
Spoke with pt's pharmacy and confirmed they currently have the Verio system and strips ready for pt .

## 2021-06-21 NOTE — TELEPHONE ENCOUNTER
Regarding: FW: Prescription Question    ----- Message -----  From: Oumou Alex Saliva  Sent: 6/16/2021  10:30 AM EDT  To: Darrel Lala MD  Subject: Prescription Question                            ----- Message from Luba Patella sent at 6/16/2021 10:30 AM EDT -----  Dr. Lisette Balderrama,  Mrs. lancaster would like an order for the one touch glucose system and the test strips     ----- Message from Ellen Wood to Sam Keith MD sent at 6/15/2021 10:07 AM -----   Our insurance is now covering certain blood glucose meters and strips and I would like to see if Dr. Lisette Balderrama would call in a prescription for one of the meters and strips so it is covered 100% instead of me having to pay $60  a month for test strips. They will cover any glucose meter  and strips by [employer] or [employer]. I have an appointment with a new Endocrinologist August 4th and need to be able to test my sugars prior to then. If so, [facility]. Please let me know if you have any questions or if I need to make an appointment to come in for this.      Thanks,    Tayler Burgos  [phone]

## 2021-08-03 DIAGNOSIS — E10.9 TYPE 1 DIABETES MELLITUS WITHOUT COMPLICATION (HCC): ICD-10-CM

## 2021-08-03 RX ORDER — BLOOD-GLUCOSE METER
EACH MISCELLANEOUS
Qty: 1 EACH | Refills: 0 | Status: SHIPPED | OUTPATIENT
Start: 2021-08-03

## 2021-08-03 RX ORDER — INSULIN LISPRO 100 [IU]/ML
INJECTION, SOLUTION INTRAVENOUS; SUBCUTANEOUS
Qty: 70 ML | Refills: 0 | Status: SHIPPED | OUTPATIENT
Start: 2021-08-03 | End: 2021-10-15

## 2021-08-18 RX ORDER — AMITRIPTYLINE HYDROCHLORIDE 25 MG/1
TABLET, FILM COATED ORAL
Qty: 90 TABLET | Refills: 1 | Status: SHIPPED | OUTPATIENT
Start: 2021-08-18

## 2021-10-15 RX ORDER — INSULIN LISPRO 100 [IU]/ML
INJECTION, SOLUTION INTRAVENOUS; SUBCUTANEOUS
Qty: 70 ML | Refills: 0 | Status: SHIPPED | OUTPATIENT
Start: 2021-10-15 | End: 2022-09-05 | Stop reason: SDUPTHER

## 2022-01-12 ENCOUNTER — TELEPHONE (OUTPATIENT)
Dept: FAMILY MEDICINE CLINIC | Age: 47
End: 2022-01-12

## 2022-01-12 NOTE — TELEPHONE ENCOUNTER
----- Message from January Guevara sent at 1/11/2022  8:37 AM EST -----  Subject: Message to Provider    QUESTIONS  Information for Provider? Patient refusal for RN triage -she at work   Dizziness for 6 weeks but has fallen 3-4 times within last 6 weeks   ---------------------------------------------------------------------------  --------------  [address]  What is the best way for the office to contact you? OK to leave message on   voicemail  Preferred Call Back Phone Number? [phone]  ---------------------------------------------------------------------------  --------------  SCRIPT ANSWERS  Relationship to Patient?  Self

## 2022-01-13 ENCOUNTER — OFFICE VISIT (OUTPATIENT)
Dept: FAMILY MEDICINE CLINIC | Age: 47
End: 2022-01-13
Payer: COMMERCIAL

## 2022-01-13 VITALS
HEART RATE: 79 BPM | DIASTOLIC BLOOD PRESSURE: 86 MMHG | OXYGEN SATURATION: 98 % | HEIGHT: 64 IN | WEIGHT: 200.6 LBS | TEMPERATURE: 98.3 F | SYSTOLIC BLOOD PRESSURE: 138 MMHG | RESPIRATION RATE: 18 BRPM | BODY MASS INDEX: 34.25 KG/M2

## 2022-01-13 DIAGNOSIS — I10 ESSENTIAL HYPERTENSION, BENIGN: ICD-10-CM

## 2022-01-13 DIAGNOSIS — E03.9 HYPOTHYROIDISM, UNSPECIFIED TYPE: ICD-10-CM

## 2022-01-13 DIAGNOSIS — R42 VERTIGO: Primary | ICD-10-CM

## 2022-01-13 DIAGNOSIS — E10.9 TYPE 1 DIABETES MELLITUS WITHOUT COMPLICATION (HCC): ICD-10-CM

## 2022-01-13 DIAGNOSIS — I42.9 CARDIOMYOPATHY, UNSPECIFIED TYPE (HCC): ICD-10-CM

## 2022-01-13 DIAGNOSIS — Z20.822 SUSPECTED COVID-19 VIRUS INFECTION: ICD-10-CM

## 2022-01-13 DIAGNOSIS — Z96.41 INSULIN PUMP IN PLACE: ICD-10-CM

## 2022-01-13 PROCEDURE — 99214 OFFICE O/P EST MOD 30 MIN: CPT | Performed by: FAMILY MEDICINE

## 2022-01-13 RX ORDER — AMOXICILLIN 500 MG/1
500 CAPSULE ORAL 3 TIMES DAILY
Qty: 21 CAPSULE | Refills: 0 | Status: SHIPPED | OUTPATIENT
Start: 2022-01-13 | End: 2022-01-20

## 2022-01-13 RX ORDER — MECLIZINE HYDROCHLORIDE 25 MG/1
25 TABLET ORAL
Qty: 30 TABLET | Refills: 1 | Status: SHIPPED | OUTPATIENT
Start: 2022-01-13 | End: 2022-01-23

## 2022-01-13 RX ORDER — ESCITALOPRAM OXALATE 10 MG/1
TABLET ORAL
COMMUNITY
Start: 2021-11-14 | End: 2022-04-22 | Stop reason: SDUPTHER

## 2022-01-13 NOTE — PROGRESS NOTES
HISTORY OF PRESENT ILLNESS  Tammie Ivory is a 55 y.o. female. 1 week worsening positional vertigo,no tinnitis,nausea or red flags. IDDM managed by Endo with insulin pump. Feeling ok,household conct has covid  Dizziness   The history is provided by the patient. This is a new problem. The current episode started more than 2 days ago. The problem occurs daily. The problem has not changed since onset. The problem is associated with standing up, normal activity and turning head. Associated symptoms include congestion and dizziness. Pertinent negatives include no visual change, no palpitations, no malaise/fatigue, no nausea, no vomiting, no bladder incontinence, no headaches, no focal weakness, no light-headedness, no weakness and no head injury. Diabetes  The history is provided by the patient. This is a chronic problem. The problem occurs daily. The problem has not changed since onset. Pertinent negatives include no headaches. Review of Systems   Constitutional: Negative for malaise/fatigue. HENT: Positive for congestion. Negative for hearing loss and tinnitus. Cardiovascular: Negative for palpitations. Gastrointestinal: Negative for heartburn, nausea and vomiting. Genitourinary: Negative for bladder incontinence. Neurological: Positive for dizziness. Negative for sensory change, focal weakness, weakness, light-headedness and headaches. Psychiatric/Behavioral: Negative for depression. Physical Exam  Constitutional:       Appearance: Normal appearance. HENT:      Head: Normocephalic and atraumatic. Right Ear: Tympanic membrane normal.      Left Ear: Tympanic membrane normal.      Nose: Congestion present. Mouth/Throat:      Mouth: Mucous membranes are moist.   Eyes:      Extraocular Movements: Extraocular movements intact. Pupils: Pupils are equal, round, and reactive to light. Cardiovascular:      Rate and Rhythm: Normal rate and regular rhythm. Pulses: Normal pulses. Heart sounds: Normal heart sounds. Musculoskeletal:      Cervical back: Neck supple. Neurological:      General: No focal deficit present. Mental Status: She is alert and oriented to person, place, and time. Mental status is at baseline. Cranial Nerves: No cranial nerve deficit. Motor: No weakness. Deep Tendon Reflexes: Reflexes normal.   Psychiatric:         Mood and Affect: Mood normal.         ASSESSMENT and PLAN  Diagnoses and all orders for this visit:    1. Vertigo  -     METABOLIC PANEL, COMPREHENSIVE; Future  -     CBC WITH AUTOMATED DIFF; Future  -     meclizine (ANTIVERT) 25 mg tablet; Take 1 Tablet by mouth three (3) times daily as needed for Dizziness for up to 10 days. -     amoxicillin (AMOXIL) 500 mg capsule; Take 1 Capsule by mouth three (3) times daily for 7 days. 2. Type 1 diabetes mellitus without complication ([address]    3. Insulin pump in place    4. Essential hypertension, benign    5. Cardiomyopathy, unspecified type ([address]    6. Hypothyroidism, unspecified type    7.  Suspected COVID-19 virus infection  -     NOVEL CORONAVIRUS (COVID-19)

## 2022-01-13 NOTE — PROGRESS NOTES
Chief Complaint   Patient presents with    Dizziness     Pt having dizziness x 6 weeks. 1. Have you been to the ER, urgent care clinic since your last visit? Hospitalized since your last visit? No    2. Have you seen or consulted any other health care providers outside of the [address] [place] [address] since your last visit? Include any pap smears or colon screening.  No

## 2022-01-14 LAB
ALBUMIN SERPL-MCNC: 3.7 G/DL (ref 3.5–5)
ALBUMIN/GLOB SERPL: 1.2 {RATIO} (ref 1.1–2.2)
ALP SERPL-CCNC: 114 U/L (ref 45–117)
ALT SERPL-CCNC: 23 U/L (ref 12–78)
ANION GAP SERPL CALC-SCNC: 5 MMOL/L (ref 5–15)
AST SERPL-CCNC: 14 U/L (ref 15–37)
BASOPHILS # BLD: 0.1 K/UL (ref 0–0.1)
BASOPHILS NFR BLD: 1 % (ref 0–1)
BILIRUB SERPL-MCNC: 0.4 MG/DL (ref 0.2–1)
BUN SERPL-MCNC: 19 MG/DL (ref 6–20)
BUN/CREAT SERPL: 24 (ref 12–20)
CALCIUM SERPL-MCNC: 8.8 MG/DL (ref 8.5–10.1)
CHLORIDE SERPL-SCNC: 104 MMOL/L (ref 97–108)
CO2 SERPL-SCNC: 26 MMOL/L (ref 21–32)
CREAT SERPL-MCNC: 0.79 MG/DL (ref 0.55–1.02)
DIFFERENTIAL METHOD BLD: ABNORMAL
EOSINOPHIL # BLD: 0.5 K/UL (ref 0–0.4)
EOSINOPHIL NFR BLD: 6 % (ref 0–7)
ERYTHROCYTE [DISTWIDTH] IN BLOOD BY AUTOMATED COUNT: 13.1 % (ref 11.5–14.5)
GLOBULIN SER CALC-MCNC: 3 G/DL (ref 2–4)
GLUCOSE SERPL-MCNC: 315 MG/DL (ref 65–100)
HCT VFR BLD AUTO: 40 % (ref 35–47)
HGB BLD-MCNC: 13 G/DL (ref 11.5–16)
IMM GRANULOCYTES # BLD AUTO: 0 K/UL (ref 0–0.04)
IMM GRANULOCYTES NFR BLD AUTO: 0 % (ref 0–0.5)
LYMPHOCYTES # BLD: 1.9 K/UL (ref 0.8–3.5)
LYMPHOCYTES NFR BLD: 25 % (ref 12–49)
MCH RBC QN AUTO: 27.9 PG (ref 26–34)
MCHC RBC AUTO-ENTMCNC: 32.5 G/DL (ref 30–36.5)
MCV RBC AUTO: 85.8 FL (ref 80–99)
MONOCYTES # BLD: 0.5 K/UL (ref 0–1)
MONOCYTES NFR BLD: 7 % (ref 5–13)
NEUTS SEG # BLD: 4.5 K/UL (ref 1.8–8)
NEUTS SEG NFR BLD: 61 % (ref 32–75)
NRBC # BLD: 0 K/UL (ref 0–0.01)
NRBC BLD-RTO: 0 PER 100 WBC
PLATELET # BLD AUTO: 257 K/UL (ref 150–400)
PMV BLD AUTO: 10.7 FL (ref 8.9–12.9)
POTASSIUM SERPL-SCNC: 4.6 MMOL/L (ref 3.5–5.1)
PROT SERPL-MCNC: 6.7 G/DL (ref 6.4–8.2)
RBC # BLD AUTO: 4.66 M/UL (ref 3.8–5.2)
SODIUM SERPL-SCNC: 135 MMOL/L (ref 136–145)
WBC # BLD AUTO: 7.5 K/UL (ref 3.6–11)

## 2022-01-16 LAB
SARS-COV-2, NAA 2 DAY TAT: NORMAL
SARS-COV-2, NAA: NOT DETECTED

## 2022-04-22 RX ORDER — ESCITALOPRAM OXALATE 10 MG/1
10 TABLET ORAL DAILY
Qty: 30 TABLET | Refills: 3 | Status: SHIPPED | OUTPATIENT
Start: 2022-04-22 | End: 2022-07-08 | Stop reason: SDUPTHER

## 2022-04-22 NOTE — TELEPHONE ENCOUNTER
MG                    Kath Zamora  Female, 52 y.o., 1975  Weight:   200 lb 9.6 oz (91 kg)    MRN:   [id number]  Phone:   [phone] (M)              PCP:   Parish Greco MD  Primary Cvg:   [facility] [place]    Last Appt With Me  None    Next Appt With Me  None    Next Appt  None                    Lexapro Prescription    May Beard MD 41 minutes ago (11:38 AM)     MG      Dr Gaston Thakur prescribed Lexapro 10 MG tablets a few months ago and she is no longer at the practice in [address] [facility]. Is this something that Dr. Mary Cruz can prescribe? Trying to avoid going to multiple doctors and I do not have any more refills left on it. Would like to avoid going to multiple doctors if he is able to refill it.  I can schedule an appointment if needed.     Thanks,     Joya               Encounter Messages    Read Composed From To  Subject   Y 4/22/2022 11:38 AM Joya Rodriguez MD  Lexapro Prescription

## 2022-05-03 NOTE — TELEPHONE ENCOUNTER
Can you send in a new prescription to [facility] on [address] [facility]. for insulin lispro 100 unit/mL injection  Commonly known as: HUMALOG and update the quantity? The prescription is currently for 80 units per day and I have been using 88-96 daily in the insulin pump. I tried to get a refill yesterday and the pharmacist said they could not refill it until 8/14/20 but I am completely out. For some reason I was getting three vials per month and now the insurance will only approve two which is not lasting the whole 30 days.  The pharmacists said a new prescription needed to be sent in with an updated dosage so they can get it filled earlier.      Thanks,     Mansi Escalante 2.46

## 2022-05-18 ENCOUNTER — VIRTUAL VISIT (OUTPATIENT)
Dept: FAMILY MEDICINE CLINIC | Age: 47
End: 2022-05-18
Payer: COMMERCIAL

## 2022-05-18 DIAGNOSIS — J20.9 ACUTE BRONCHITIS, UNSPECIFIED ORGANISM: Primary | ICD-10-CM

## 2022-05-18 PROCEDURE — 99213 OFFICE O/P EST LOW 20 MIN: CPT | Performed by: FAMILY MEDICINE

## 2022-05-18 RX ORDER — AMOXICILLIN 500 MG/1
500 CAPSULE ORAL 3 TIMES DAILY
Qty: 21 CAPSULE | Refills: 0 | Status: SHIPPED | OUTPATIENT
Start: 2022-05-18 | End: 2022-05-25

## 2022-05-18 RX ORDER — PROMETHAZINE HYDROCHLORIDE AND DEXTROMETHORPHAN HYDROBROMIDE 6.25; 15 MG/5ML; MG/5ML
5 SYRUP ORAL
Qty: 180 ML | Refills: 1 | Status: SHIPPED | OUTPATIENT
Start: 2022-05-18 | End: 2022-05-25

## 2022-05-18 NOTE — PROGRESS NOTES
HISTORY OF PRESENT ILLNESS  Patient encounter by synchronous (real time) audio technology which is patient initiated. The Patient is aware that this encounter is a billable service with coverage determined by their insurance carrier,as discussed at the time of check in. The patient has given verbal consent to proceed    Lennox Storm is a 52 y.o. female. has been ill x 4days with headache,chills,myalgias sore throat and cogh ,sx somewhat better today   2 home covid tests were negative  Cold Symptoms  The history is provided by the patient. This is a new problem. The current episode started more than 2 days ago. The problem occurs constantly. The problem has been gradually improving. The cough is non-productive. There has been no fever. Associated symptoms include chest pain, chills, headaches, rhinorrhea, sore throat and myalgias. The treatment provided mild relief. Review of Systems   Constitutional: Positive for chills. HENT: Positive for rhinorrhea and sore throat. Cardiovascular: Positive for chest pain. Musculoskeletal: Positive for myalgias. Neurological: Positive for headaches. Physical Exam   deferred    ASSESSMENT and PLAN  Diagnoses and all orders for this visit:    1. Acute bronchitis, unspecified organism  -     promethazine-dextromethorphan (PROMETHAZINE-DM) 6.25-15 mg/5 mL syrup; Take 5 mL by mouth four (4) times daily as needed for Cough for up to 7 days. -     amoxicillin (AMOXIL) 500 mg capsule; Take 1 Capsule by mouth three (3) times daily for 7 days.     Due to this being a telehealth encounter (During  [address] [facility]),evaluation of the following organ systems was limited:Vitals/Constitutional/EENT,Respiratory,CV,GI,,MS,Neuro,Skin /Heme-Lymph-Imm  Pursuant to the emergency declaration under the [employer] and [place] [address] waiver authority and the [employer] [facility] Appropriations Act,this Virtual Visit was conducted ,with patient consent,to reduce the patients risk of exposure to Covid 19 and to provide continuity of care  for an established patient. Services were provided through a telephonic synchronous discussion virtually to substitute for in person appointment. This visit was completed using Doxy. me    Time in Virtual Visit:12    minutes

## 2022-05-18 NOTE — PROGRESS NOTES
Chief Complaint   Patient presents with    Cold Symptoms     Pt has cough, bodyaches, HA, sore throat, diarrhea and back pain. 1. \"Have you been to the ER, urgent care clinic since your last visit? Hospitalized since your last visit? \" No    2. \"Have you seen or consulted any other health care providers outside of the [address] [place] [address] since your last visit? \" No     3. For patients aged 39-70: Has the patient had a colonoscopy / FIT/ Cologuard? No      If the patient is female:    4. For patients aged 41-77: Has the patient had a mammogram within the past 2 years? Yes - Care Gap present. Most recent result on file      5. For patients aged 21-65: Has the patient had a pap smear? Yes - Care Gap present.  Most recent result on file    Health Maintenance Due   Topic Date Due    Hepatitis C Screening  Never done    Eye Exam Retinal or Dilated  Never done    Cervical cancer screen  Never done    Pneumococcal 0-64 years (2 - PCV) 01/05/2016    Foot Exam Q1  09/23/2020    MICROALBUMIN Q1  09/23/2020    A1C test (Diabetic or Prediabetic)  05/28/2021    Lipid Screen  05/28/2021    COVID-19 Vaccine (3 - Booster for [employer] series) 09/27/2021

## 2022-05-18 NOTE — LETTER
NOTIFICATION OF RETURN TO WORK / SCHOOL    5/18/2022 5:21 PM    Ms. Suzanna SERVIN [address] [address]  . To Whom It May Concern:    Joya Beard was under the care of [facility] from 5/15/22. She will be able to return to work/school on 5/23/22 with {Restrictions:19768}. If there are questions or concerns please have the patient contact our office.     Sincerely,      Sara Velasquez MD

## 2022-06-22 DIAGNOSIS — E10.9 TYPE 1 DIABETES MELLITUS WITHOUT COMPLICATION (HCC): ICD-10-CM

## 2022-06-22 RX ORDER — BLOOD SUGAR DIAGNOSTIC
STRIP MISCELLANEOUS
Qty: 150 STRIP | Refills: 11 | Status: SHIPPED | OUTPATIENT
Start: 2022-06-22

## 2022-07-08 RX ORDER — ESCITALOPRAM OXALATE 10 MG/1
10 TABLET ORAL DAILY
Qty: 30 TABLET | Refills: 3 | Status: SHIPPED | OUTPATIENT
Start: 2022-07-08

## 2022-09-05 DIAGNOSIS — I10 ESSENTIAL HYPERTENSION, BENIGN: ICD-10-CM

## 2022-09-06 RX ORDER — INSULIN LISPRO 100 [IU]/ML
INJECTION, SOLUTION INTRAVENOUS; SUBCUTANEOUS
Qty: 70 ML | Refills: 0
Start: 2022-09-06 | End: 2022-09-15 | Stop reason: SDUPTHER

## 2022-09-06 RX ORDER — METOPROLOL TARTRATE 50 MG/1
50 TABLET ORAL EVERY EVENING
Qty: 90 TABLET | Refills: 0 | Status: SHIPPED | OUTPATIENT
Start: 2022-09-06

## 2022-09-15 NOTE — TELEPHONE ENCOUNTER
Last visit:5/18/22(virtual)  Next visit:Not scheduled  Previous refill 9/06/22(70mL+0R)    Requested Prescriptions     Pending Prescriptions Disp Refills    insulin lispro (HumaLOG U-100 Insulin) 100 unit/mL injection 70 mL 0     Sig: INJECT 88-90 UNITS VIA PUMP DAILY     For Pharmacy Admin Tracking Only    CPA in place:   Recommendation Provided To:    Intervention Detail: New Rx: 1, reason: Patient Preference  Gap Closed?:   Intervention Accepted By:   Time Spent (min): 5

## 2022-09-16 RX ORDER — INSULIN LISPRO 100 [IU]/ML
INJECTION, SOLUTION INTRAVENOUS; SUBCUTANEOUS
Qty: 70 ML | Refills: 0
Start: 2022-09-16

## 2022-09-27 ENCOUNTER — TELEPHONE (OUTPATIENT)
Dept: FAMILY MEDICINE CLINIC | Age: 47
End: 2022-09-27

## 2022-09-27 NOTE — TELEPHONE ENCOUNTER
Patient would like a call from [facility] regarding her insulin pump is broken she can be reached @ [phone]

## 2022-09-27 NOTE — TELEPHONE ENCOUNTER
Patient would like a call from [facility] regarding her insulin pump is broken she can be reached @ [phone]

## 2022-09-28 ENCOUNTER — OFFICE VISIT (OUTPATIENT)
Dept: FAMILY MEDICINE CLINIC | Age: 47
End: 2022-09-28
Payer: MEDICAID

## 2022-09-28 VITALS
WEIGHT: 211 LBS | HEART RATE: 75 BPM | RESPIRATION RATE: 20 BRPM | SYSTOLIC BLOOD PRESSURE: 114 MMHG | BODY MASS INDEX: 36.02 KG/M2 | TEMPERATURE: 97.7 F | OXYGEN SATURATION: 97 % | HEIGHT: 64 IN | DIASTOLIC BLOOD PRESSURE: 78 MMHG

## 2022-09-28 DIAGNOSIS — I10 ESSENTIAL HYPERTENSION, BENIGN: ICD-10-CM

## 2022-09-28 DIAGNOSIS — E10.9 TYPE 1 DIABETES MELLITUS WITHOUT COMPLICATION (HCC): Primary | ICD-10-CM

## 2022-09-28 LAB — HBA1C MFR BLD HPLC: 7.7 %

## 2022-09-28 PROCEDURE — 83036 HEMOGLOBIN GLYCOSYLATED A1C: CPT | Performed by: FAMILY MEDICINE

## 2022-09-28 PROCEDURE — 3051F HG A1C>EQUAL 7.0%<8.0%: CPT | Performed by: FAMILY MEDICINE

## 2022-09-28 PROCEDURE — 99214 OFFICE O/P EST MOD 30 MIN: CPT | Performed by: FAMILY MEDICINE

## 2022-09-28 RX ORDER — INSULIN LISPRO 100 [IU]/ML
INJECTION, SOLUTION INTRAVENOUS; SUBCUTANEOUS
Qty: 4 ADJUSTABLE DOSE PRE-FILLED PEN SYRINGE | Refills: 11 | Status: SHIPPED | OUTPATIENT
Start: 2022-09-28

## 2022-09-28 RX ORDER — INSULIN GLARGINE 100 [IU]/ML
55 INJECTION, SOLUTION SUBCUTANEOUS DAILY
Qty: 6 ADJUSTABLE DOSE PRE-FILLED PEN SYRINGE | Refills: 11 | Status: SHIPPED | OUTPATIENT
Start: 2022-09-28

## 2022-09-28 NOTE — PROGRESS NOTES
Identified pt with two pt identifiers(name and ). Reviewed record in preparation for visit and have obtained necessary documentation. Chief Complaint   Patient presents with    Other     Insulin pump issues         Vitals:    22 1619   BP: 114/78   Pulse: 75   Resp: 20   Temp: 97.7 °F (36.5 °C)   TempSrc: Oral   SpO2: 97%   Weight: 211 lb (95.7 kg)   Height: 5' 4\" (1.626 m)       Health Maintenance Due   Topic    Hepatitis C Screening     Eye Exam Retinal or Dilated     Cervical cancer screen     Foot Exam Q1     MICROALBUMIN Q1     A1C test (Diabetic or Prediabetic)     Lipid Screen     COVID-19 Vaccine (3 - Booster for [employer] series)    Flu Vaccine (1)     Results for orders placed or performed in visit on 22   AMB POC HEMOGLOBIN A1C   Result Value Ref Range    Hemoglobin A1c (POC) 7.7 %       Coordination of Care Questionnaire:  :   1) Have you been to an emergency room, urgent care, or hospitalized since your last visit? If yes, where when, and reason for visit? no       2. Have seen or consulted any other health care provider since your last visit? If yes, where when, and reason for visit? NO      Patient is accompanied by self I have received verbal consent from Rox Betancourt Dr to discuss any/all medical information while they are present in the room.

## 2022-09-28 NOTE — PROGRESS NOTES
HISTORY OF PRESENT ILLNESS  Zachary Loya is a 52 y.o. female. f/u IDDM having iinsulin pump malfunctioning and cant get Endo appt for months. Using approximately 85 units of Humalog daily with fairly good control. Generally feeling well,working from home. F/U HBP,Chol,Hypothyroidism  Diabetes  The history is provided by the Patient. This is a chronic problem. The problem occurs daily. The problem has not changed since onset. Pertinent negatives include no chest pain and no abdominal pain. Medication Problem  The history is provided by the Patient. This is a new problem. The current episode started more than 1 week ago. The problem occurs daily. The problem has not changed since onset. Pertinent negatives include no chest pain and no abdominal pain. Hypertension   The history is provided by the Patient. This is a chronic problem. The problem has not changed since onset. Pertinent negatives include no chest pain, no malaise/fatigue and no dizziness. Cholesterol Problem  The history is provided by the Patient. This is a chronic problem. The problem occurs daily. The problem has not changed since onset. Pertinent negatives include no chest pain and no abdominal pain. Review of Systems   Constitutional:  Negative for fever, malaise/fatigue and weight loss. Cardiovascular:  Negative for chest pain. Gastrointestinal:  Negative for abdominal pain. Genitourinary:  Negative for frequency. Neurological:  Negative for dizziness. Psychiatric/Behavioral:  Negative for depression. The patient is not nervous/anxious. Physical Exam  Constitutional:       Appearance: Normal appearance. She is obese. HENT:      Head: Normocephalic and atraumatic. Right Ear: Tympanic membrane normal.      Left Ear: Tympanic membrane normal.      Nose: Nose normal.      Mouth/Throat:      Mouth: Mucous membranes are moist.   Cardiovascular:      Rate and Rhythm: Normal rate and regular rhythm. Pulses: Normal pulses. Heart sounds: Normal heart sounds. Pulmonary:      Effort: Pulmonary effort is normal.      Breath sounds: Normal breath sounds. Abdominal:      Palpations: Abdomen is soft. Musculoskeletal:      Cervical back: Normal range of motion and neck supple. Skin:     General: Skin is warm and dry. ASSESSMENT and PLAN  Diagnoses and all orders for this visit:    1. Type 1 diabetes mellitus without complication (HCC)will start basal and mealtime insulin,consider Freestyle Edilia,to call prn  -     AMB POC HEMOGLOBIN A1C    2. Essential hypertension, benign    Other orders  -     insulin glargine (LANTUS,BASAGLAR) 100 unit/mL (3 mL) inpn; 55 Units by SubCUTAneous route daily.   -     insulin lispro (HUMALOG) 100 unit/mL kwikpen; Mealtime sliding scale:BS <1OO O units,-200  5 units,-300  10 units,BS >300  15units

## 2022-09-30 DIAGNOSIS — E10.9 TYPE 1 DIABETES MELLITUS WITHOUT COMPLICATION (HCC): Primary | ICD-10-CM

## 2022-09-30 RX ORDER — FLASH GLUCOSE SCANNING READER
1 EACH MISCELLANEOUS DAILY
Qty: 2 EACH | Refills: 5 | Status: SHIPPED | OUTPATIENT
Start: 2022-09-30

## 2022-09-30 RX ORDER — FLASH GLUCOSE SENSOR
1 KIT MISCELLANEOUS DAILY
Qty: 2 KIT | Refills: 5 | Status: SHIPPED | OUTPATIENT
Start: 2022-09-30

## 2022-10-07 ENCOUNTER — TELEPHONE (OUTPATIENT)
Dept: FAMILY MEDICINE CLINIC | Age: 47
End: 2022-10-07

## 2022-10-07 DIAGNOSIS — J01.00 SUBACUTE MAXILLARY SINUSITIS: Primary | ICD-10-CM

## 2022-10-07 RX ORDER — AMOXICILLIN 500 MG/1
500 CAPSULE ORAL 3 TIMES DAILY
Qty: 21 CAPSULE | Refills: 0 | Status: SHIPPED | OUTPATIENT
Start: 2022-10-07 | End: 2022-10-14

## 2022-10-07 NOTE — TELEPHONE ENCOUNTER
----- Message from Zach Mishra sent at 10/7/2022  7:32 AM EDT -----  Regarding: Sinus Medication  Good Morning. Wanted to see if Dr Bret Hall would call in a zpack for me. I took a covid test last night and it was negative so I'm pretty sure I have a sinus infection. If so, [facility].     Thank You    Jil Dose  ----------------------------------------------------------------------------  Ahmet sent to  and waiting for approval.

## 2022-10-09 RX ORDER — AZITHROMYCIN 250 MG/1
TABLET, FILM COATED ORAL
Qty: 6 TABLET | Refills: 0 | OUTPATIENT
Start: 2022-10-09 | End: 2022-10-12

## 2022-11-15 ENCOUNTER — TELEPHONE (OUTPATIENT)
Dept: FAMILY MEDICINE CLINIC | Age: 47
End: 2022-11-15

## 2022-11-15 NOTE — TELEPHONE ENCOUNTER
Spoke to the pt and she will be picking up the forms today. Forms, copied and given to [facility] to scan in pt chart.

## 2022-12-08 DIAGNOSIS — I10 ESSENTIAL HYPERTENSION, BENIGN: ICD-10-CM

## 2022-12-09 ENCOUNTER — TELEPHONE (OUTPATIENT)
Dept: FAMILY MEDICINE CLINIC | Age: 47
End: 2022-12-09

## 2022-12-09 RX ORDER — ESCITALOPRAM OXALATE 10 MG/1
TABLET ORAL
Qty: 30 TABLET | Refills: 3 | Status: SHIPPED | OUTPATIENT
Start: 2022-12-09

## 2022-12-09 RX ORDER — METOPROLOL TARTRATE 50 MG/1
TABLET ORAL
Qty: 90 TABLET | Refills: 0 | Status: SHIPPED | OUTPATIENT
Start: 2022-12-09

## 2022-12-09 NOTE — TELEPHONE ENCOUNTER
Americo Mock with [employer] is requesting a prior auth on flash glucose scanning reader (FreeStyle [place] [employer] [place]) misc please give her a call @ [phone]

## 2022-12-13 ENCOUNTER — DOCUMENTATION ONLY (OUTPATIENT)
Dept: FAMILY MEDICINE CLINIC | Age: 47
End: 2022-12-13

## 2022-12-14 RX ORDER — INSULIN LISPRO 100 [IU]/ML
INJECTION, SOLUTION INTRAVENOUS; SUBCUTANEOUS
Qty: 4 ADJUSTABLE DOSE PRE-FILLED PEN SYRINGE | Refills: 11 | OUTPATIENT
Start: 2022-12-14

## 2022-12-14 NOTE — TELEPHONE ENCOUNTER
Humalog was sent on 9/28/22 for #4 with 11 refills      For Pharmacy [facility] in place:   Recommendation Provided To:    Intervention Detail: New Rx: 1, reason: Patient Preference  Gap Closed?:   Intervention Accepted By:   Time Spent (min): 5

## 2023-03-10 DIAGNOSIS — I10 ESSENTIAL HYPERTENSION, BENIGN: ICD-10-CM

## 2023-03-10 RX ORDER — METOPROLOL TARTRATE 50 MG/1
TABLET ORAL
Qty: 90 TABLET | Refills: 0 | Status: SHIPPED | OUTPATIENT
Start: 2023-03-10

## 2023-04-18 RX ORDER — ESCITALOPRAM OXALATE 10 MG/1
TABLET ORAL
Qty: 90 TABLET | Refills: 2 | Status: SHIPPED | OUTPATIENT
Start: 2023-04-18

## 2023-05-08 ENCOUNTER — TELEPHONE (OUTPATIENT)
Age: 48
End: 2023-05-08

## 2023-05-08 ENCOUNTER — OFFICE VISIT (OUTPATIENT)
Age: 48
End: 2023-05-08
Payer: COMMERCIAL

## 2023-05-08 VITALS
OXYGEN SATURATION: 95 % | HEIGHT: 64 IN | WEIGHT: 214.8 LBS | HEART RATE: 85 BPM | TEMPERATURE: 98.5 F | RESPIRATION RATE: 18 BRPM | SYSTOLIC BLOOD PRESSURE: 142 MMHG | BODY MASS INDEX: 36.67 KG/M2 | DIASTOLIC BLOOD PRESSURE: 57 MMHG

## 2023-05-08 DIAGNOSIS — I42.0 DILATED CARDIOMYOPATHY (HCC): ICD-10-CM

## 2023-05-08 DIAGNOSIS — E03.9 HYPOTHYROIDISM, UNSPECIFIED TYPE: ICD-10-CM

## 2023-05-08 DIAGNOSIS — Z78.0 POSTMENOPAUSAL: ICD-10-CM

## 2023-05-08 DIAGNOSIS — E66.01 SEVERE OBESITY (BMI 35.0-39.9) WITH COMORBIDITY (HCC): ICD-10-CM

## 2023-05-08 DIAGNOSIS — Z11.4 SCREENING FOR HIV (HUMAN IMMUNODEFICIENCY VIRUS): ICD-10-CM

## 2023-05-08 DIAGNOSIS — I10 ESSENTIAL (PRIMARY) HYPERTENSION: ICD-10-CM

## 2023-05-08 DIAGNOSIS — E10.9 TYPE 1 DIABETES MELLITUS WITHOUT COMPLICATIONS (HCC): Primary | ICD-10-CM

## 2023-05-08 DIAGNOSIS — Z11.59 ENCOUNTER FOR HEPATITIS C SCREENING TEST FOR LOW RISK PATIENT: ICD-10-CM

## 2023-05-08 LAB — HBA1C MFR BLD: 7.9 %

## 2023-05-08 PROCEDURE — 99213 OFFICE O/P EST LOW 20 MIN: CPT | Performed by: FAMILY MEDICINE

## 2023-05-08 PROCEDURE — 3077F SYST BP >= 140 MM HG: CPT | Performed by: FAMILY MEDICINE

## 2023-05-08 PROCEDURE — 83036 HEMOGLOBIN GLYCOSYLATED A1C: CPT | Performed by: FAMILY MEDICINE

## 2023-05-08 PROCEDURE — 3078F DIAST BP <80 MM HG: CPT | Performed by: FAMILY MEDICINE

## 2023-05-08 SDOH — ECONOMIC STABILITY: INCOME INSECURITY: HOW HARD IS IT FOR YOU TO PAY FOR THE VERY BASICS LIKE FOOD, HOUSING, MEDICAL CARE, AND HEATING?: NOT VERY HARD

## 2023-05-08 SDOH — ECONOMIC STABILITY: FOOD INSECURITY: WITHIN THE PAST 12 MONTHS, YOU WORRIED THAT YOUR FOOD WOULD RUN OUT BEFORE YOU GOT MONEY TO BUY MORE.: NEVER TRUE

## 2023-05-08 SDOH — ECONOMIC STABILITY: FOOD INSECURITY: WITHIN THE PAST 12 MONTHS, THE FOOD YOU BOUGHT JUST DIDN'T LAST AND YOU DIDN'T HAVE MONEY TO GET MORE.: NEVER TRUE

## 2023-05-08 ASSESSMENT — ENCOUNTER SYMPTOMS
SHORTNESS OF BREATH: 0
APNEA: 0
BLURRED VISION: 0

## 2023-05-08 ASSESSMENT — PATIENT HEALTH QUESTIONNAIRE - PHQ9
SUM OF ALL RESPONSES TO PHQ QUESTIONS 1-9: 0
SUM OF ALL RESPONSES TO PHQ9 QUESTIONS 1 & 2: 0
SUM OF ALL RESPONSES TO PHQ QUESTIONS 1-9: 0
SUM OF ALL RESPONSES TO PHQ QUESTIONS 1-9: 0
2. FEELING DOWN, DEPRESSED OR HOPELESS: 0
1. LITTLE INTEREST OR PLEASURE IN DOING THINGS: 0
SUM OF ALL RESPONSES TO PHQ QUESTIONS 1-9: 0

## 2023-05-08 NOTE — PROGRESS NOTES
Chief Complaint   Patient presents with    Follow-up     Patient is here today for a diabetes follow up. Patient does not have any concerns or complaints. 1. Have you been to the ER, urgent care clinic since your last visit? Hospitalized since your last visit? No    2. Have you seen or consulted any other health care providers outside of the [address] [place] [address] since your last visit? Include any pap smears or colon screening.   [facility] by [place]

## 2023-05-08 NOTE — PROGRESS NOTES
Patient ID: Shaquille Stevens is a 50 y.o. female. f/u dm,hbp,chol. Doing well using basal and mealtime insulin. Was turned down by insurer for [facility] device    Diabetes  She presents for her follow-up diabetic visit. She has type 2 diabetes mellitus. Pertinent negatives for hypoglycemia include no confusion, mood changes or sleepiness. Pertinent negatives for diabetes include no blurred vision, no polydipsia, no polyphagia and no weight loss. Pertinent negatives for hypoglycemia complications include no blackouts. Symptoms are stable. Pertinent negatives for diabetic complications include no autonomic neuropathy, nephropathy or peripheral neuropathy. Hypertension  This is a chronic problem. The problem is unchanged. Pertinent negatives include no blurred vision, palpitations, peripheral edema, PND or shortness of breath. There is no history of chronic renal disease. Hyperlipidemia  This is a chronic problem. The problem is controlled. Recent lipid tests were reviewed and are normal. She has no history of chronic renal disease or hypothyroidism. Pertinent negatives include no shortness of breath. Review of Systems   Constitutional:  Negative for activity change, unexpected weight change and weight loss. Eyes:  Negative for blurred vision. Respiratory:  Negative for apnea and shortness of breath. Cardiovascular:  Negative for palpitations and PND. Endocrine: Negative for polydipsia and polyphagia. Genitourinary:  Negative for difficulty urinating and menstrual problem. Psychiatric/Behavioral:  Negative for confusion. Objective:   Physical Exam  Constitutional:       Appearance: Normal appearance. She is obese. HENT:      Right Ear: Tympanic membrane normal.      Left Ear: Tympanic membrane normal.      Nose: Nose normal.      Mouth/Throat:      Mouth: Mucous membranes are moist.   Cardiovascular:      Rate and Rhythm: Normal rate and regular rhythm.       Heart sounds: Normal

## 2023-05-09 DIAGNOSIS — G47.9 SLEEP DISTURBANCE: Primary | ICD-10-CM

## 2023-05-09 RX ORDER — TRAZODONE HYDROCHLORIDE 50 MG/1
50 TABLET ORAL NIGHTLY PRN
Qty: 90 TABLET | Refills: 1 | Status: SHIPPED | OUTPATIENT
Start: 2023-05-09

## 2023-05-10 ENCOUNTER — TELEPHONE (OUTPATIENT)
Age: 48
End: 2023-05-10

## 2023-05-10 LAB
ALBUMIN SERPL-MCNC: 4.5 G/DL (ref 3.8–4.8)
ALBUMIN/CREAT UR: 32 MG/G CREAT (ref 0–29)
ALBUMIN/GLOB SERPL: 1.7 {RATIO} (ref 1.2–2.2)
ALP SERPL-CCNC: 143 IU/L (ref 44–121)
ALT SERPL-CCNC: 22 IU/L (ref 0–32)
AST SERPL-CCNC: 22 IU/L (ref 0–40)
BASOPHILS # BLD AUTO: 0.1 X10E3/UL (ref 0–0.2)
BASOPHILS NFR BLD AUTO: 1 %
BILIRUB SERPL-MCNC: 0.3 MG/DL (ref 0–1.2)
BUN SERPL-MCNC: 16 MG/DL (ref 6–24)
BUN/CREAT SERPL: 22 (ref 9–23)
CALCIUM SERPL-MCNC: 9.8 MG/DL (ref 8.7–10.2)
CHLORIDE SERPL-SCNC: 105 MMOL/L (ref 96–106)
CHOLEST SERPL-MCNC: 206 MG/DL (ref 100–199)
CO2 SERPL-SCNC: 24 MMOL/L (ref 20–29)
CREAT SERPL-MCNC: 0.73 MG/DL (ref 0.57–1)
CREAT UR-MCNC: 147.1 MG/DL
EGFRCR SERPLBLD CKD-EPI 2021: 101 ML/MIN/1.73
EOSINOPHIL # BLD AUTO: 1.1 X10E3/UL (ref 0–0.4)
EOSINOPHIL NFR BLD AUTO: 11 %
ERYTHROCYTE [DISTWIDTH] IN BLOOD BY AUTOMATED COUNT: 13.2 % (ref 11.7–15.4)
GLOBULIN SER CALC-MCNC: 2.7 G/DL (ref 1.5–4.5)
GLUCOSE SERPL-MCNC: 83 MG/DL (ref 70–99)
HCT VFR BLD AUTO: 42.2 % (ref 34–46.6)
HCV IGG SERPL QL IA: NON REACTIVE
HDLC SERPL-MCNC: 41 MG/DL
HGB BLD-MCNC: 13.5 G/DL (ref 11.1–15.9)
HIV 1+2 AB+HIV1 P24 AG SERPL QL IA: NON REACTIVE
IMM GRANULOCYTES # BLD AUTO: 0 X10E3/UL (ref 0–0.1)
IMM GRANULOCYTES NFR BLD AUTO: 0 %
LDLC SERPL CALC-MCNC: 125 MG/DL (ref 0–99)
LYMPHOCYTES # BLD AUTO: 2.1 X10E3/UL (ref 0.7–3.1)
LYMPHOCYTES NFR BLD AUTO: 21 %
MCH RBC QN AUTO: 26.3 PG (ref 26.6–33)
MCHC RBC AUTO-ENTMCNC: 32 G/DL (ref 31.5–35.7)
MCV RBC AUTO: 82 FL (ref 79–97)
MICROALBUMIN UR-MCNC: 47.1 UG/ML
MONOCYTES # BLD AUTO: 0.7 X10E3/UL (ref 0.1–0.9)
MONOCYTES NFR BLD AUTO: 7 %
NEUTROPHILS # BLD AUTO: 5.7 X10E3/UL (ref 1.4–7)
NEUTROPHILS NFR BLD AUTO: 60 %
PLATELET # BLD AUTO: 334 X10E3/UL (ref 150–450)
POTASSIUM SERPL-SCNC: 4.1 MMOL/L (ref 3.5–5.2)
PROT SERPL-MCNC: 7.2 G/DL (ref 6–8.5)
RBC # BLD AUTO: 5.13 X10E6/UL (ref 3.77–5.28)
SODIUM SERPL-SCNC: 143 MMOL/L (ref 134–144)
TRIGL SERPL-MCNC: 228 MG/DL (ref 0–149)
VLDLC SERPL CALC-MCNC: 40 MG/DL (ref 5–40)
WBC # BLD AUTO: 9.6 X10E3/UL (ref 3.4–10.8)

## 2023-06-17 DIAGNOSIS — I10 ESSENTIAL (PRIMARY) HYPERTENSION: ICD-10-CM

## 2023-06-19 RX ORDER — METOPROLOL TARTRATE 50 MG/1
TABLET, FILM COATED ORAL
Qty: 90 TABLET | Refills: 3 | Status: SHIPPED | OUTPATIENT
Start: 2023-06-19

## 2023-06-19 NOTE — TELEPHONE ENCOUNTER
Last appointment: 5/8/23  Next appointment: 9/8/23  Previous refill encounter(s): 3/10/23 #90    Requested Prescriptions     Pending Prescriptions Disp Refills    metoprolol tartrate (LOPRESSOR) 50 MG tablet [Pharmacy Med Name: METOPROLOL TARTRATE 50 MG TAB] 90 tablet 3     Sig: TAKE 1 TABLET BY MOUTH EVERY DAY IN THE EVENING         For Pharmacy Admin Tracking Only    Program: Medication Refill  CPA in place:    Recommendation Provided To:    Intervention Detail: New Rx: 1, reason: Patient Preference  Intervention Accepted By:   Charisse Shah Closed?:    Time Spent (min): 5

## 2023-07-03 ENCOUNTER — HOSPITAL ENCOUNTER (EMERGENCY)
Facility: HOSPITAL | Age: 48
Discharge: HOME | End: 2023-07-03
Attending: STUDENT IN AN ORGANIZED HEALTH CARE EDUCATION/TRAINING PROGRAM

## 2023-07-03 VITALS
BODY MASS INDEX: 35.85 KG/M2 | WEIGHT: 210 LBS | OXYGEN SATURATION: 100 % | DIASTOLIC BLOOD PRESSURE: 81 MMHG | SYSTOLIC BLOOD PRESSURE: 129 MMHG | HEIGHT: 64 IN | HEART RATE: 55 BPM | RESPIRATION RATE: 18 BRPM | TEMPERATURE: 97.9 F

## 2023-07-03 DIAGNOSIS — E16.2 HYPOGLYCEMIA: Primary | ICD-10-CM

## 2023-07-03 LAB
ALBUMIN SERPL-MCNC: 3.7 G/DL (ref 3.5–5)
ALBUMIN/GLOB SERPL: 1 (ref 1.1–2.2)
ALP SERPL-CCNC: 115 U/L (ref 45–117)
ALT SERPL-CCNC: 24 U/L (ref 12–78)
ANION GAP SERPL CALC-SCNC: 6 MMOL/L (ref 5–15)
APPEARANCE UR: CLEAR
AST SERPL-CCNC: 21 U/L (ref 15–37)
BACTERIA URNS QL MICRO: NEGATIVE /HPF
BASOPHILS # BLD: 0 K/UL (ref 0–0.1)
BASOPHILS NFR BLD: 0 % (ref 0–1)
BILIRUB SERPL-MCNC: 0.2 MG/DL (ref 0.2–1)
BILIRUB UR QL: NEGATIVE
BUN SERPL-MCNC: 18 MG/DL (ref 6–20)
BUN/CREAT SERPL: 21 (ref 12–20)
CALCIUM SERPL-MCNC: 8.7 MG/DL (ref 8.5–10.1)
CHLORIDE SERPL-SCNC: 107 MMOL/L (ref 97–108)
CO2 SERPL-SCNC: 28 MMOL/L (ref 21–32)
COLOR UR: ABNORMAL
CREAT SERPL-MCNC: 0.85 MG/DL (ref 0.55–1.02)
DIFFERENTIAL METHOD BLD: NORMAL
EOSINOPHIL # BLD: 0.3 K/UL (ref 0–0.4)
EOSINOPHIL NFR BLD: 4 % (ref 0–7)
EPITH CASTS URNS QL MICRO: ABNORMAL /LPF
ERYTHROCYTE [DISTWIDTH] IN BLOOD BY AUTOMATED COUNT: 13.1 % (ref 11.5–14.5)
GLOBULIN SER CALC-MCNC: 3.8 G/DL (ref 2–4)
GLUCOSE BLD STRIP.AUTO-MCNC: 150 MG/DL (ref 65–117)
GLUCOSE BLD STRIP.AUTO-MCNC: 175 MG/DL (ref 65–117)
GLUCOSE BLD STRIP.AUTO-MCNC: 232 MG/DL (ref 65–117)
GLUCOSE SERPL-MCNC: 98 MG/DL (ref 65–100)
GLUCOSE UR STRIP.AUTO-MCNC: NEGATIVE MG/DL
HCT VFR BLD AUTO: 37.9 % (ref 35–47)
HGB BLD-MCNC: 12.2 G/DL (ref 11.5–16)
HGB UR QL STRIP: NEGATIVE
HYALINE CASTS URNS QL MICRO: ABNORMAL /LPF (ref 0–2)
IMM GRANULOCYTES # BLD AUTO: 0 K/UL (ref 0–0.04)
IMM GRANULOCYTES NFR BLD AUTO: 0 % (ref 0–0.5)
KETONES UR QL STRIP.AUTO: NEGATIVE MG/DL
LEUKOCYTE ESTERASE UR QL STRIP.AUTO: ABNORMAL
LYMPHOCYTES # BLD: 1.2 K/UL (ref 0.8–3.5)
LYMPHOCYTES NFR BLD: 15 % (ref 12–49)
MCH RBC QN AUTO: 27 PG (ref 26–34)
MCHC RBC AUTO-ENTMCNC: 32.2 G/DL (ref 30–36.5)
MCV RBC AUTO: 83.8 FL (ref 80–99)
MONOCYTES # BLD: 0.5 K/UL (ref 0–1)
MONOCYTES NFR BLD: 6 % (ref 5–13)
NEUTS SEG # BLD: 5.8 K/UL (ref 1.8–8)
NEUTS SEG NFR BLD: 75 % (ref 32–75)
NITRITE UR QL STRIP.AUTO: NEGATIVE
NRBC # BLD: 0 K/UL (ref 0–0.01)
NRBC BLD-RTO: 0 PER 100 WBC
PH UR STRIP: 5.5 (ref 5–8)
PLATELET # BLD AUTO: 248 K/UL (ref 150–400)
PMV BLD AUTO: 9.7 FL (ref 8.9–12.9)
POTASSIUM SERPL-SCNC: 3.9 MMOL/L (ref 3.5–5.1)
PROT SERPL-MCNC: 7.5 G/DL (ref 6.4–8.2)
PROT UR STRIP-MCNC: NEGATIVE MG/DL
RBC # BLD AUTO: 4.52 M/UL (ref 3.8–5.2)
RBC #/AREA URNS HPF: ABNORMAL /HPF (ref 0–5)
SERVICE CMNT-IMP: ABNORMAL
SODIUM SERPL-SCNC: 141 MMOL/L (ref 136–145)
SP GR UR REFRACTOMETRY: 1.01
URINE CULTURE IF INDICATED: ABNORMAL
UROBILINOGEN UR QL STRIP.AUTO: 0.2 EU/DL (ref 0.2–1)
WBC # BLD AUTO: 7.8 K/UL (ref 3.6–11)
WBC URNS QL MICRO: ABNORMAL /HPF (ref 0–4)

## 2023-07-03 PROCEDURE — 80053 COMPREHEN METABOLIC PANEL: CPT

## 2023-07-03 PROCEDURE — 81001 URINALYSIS AUTO W/SCOPE: CPT

## 2023-07-03 PROCEDURE — 85025 COMPLETE CBC W/AUTO DIFF WBC: CPT

## 2023-07-03 PROCEDURE — 87086 URINE CULTURE/COLONY COUNT: CPT

## 2023-07-03 PROCEDURE — 36415 COLL VENOUS BLD VENIPUNCTURE: CPT

## 2023-07-03 PROCEDURE — 99283 EMERGENCY DEPT VISIT LOW MDM: CPT

## 2023-07-03 PROCEDURE — 82962 GLUCOSE BLOOD TEST: CPT

## 2023-07-03 RX ORDER — CEPHALEXIN 500 MG/1
500 CAPSULE ORAL 4 TIMES DAILY
Qty: 20 CAPSULE | Refills: 0 | Status: SHIPPED | OUTPATIENT
Start: 2023-07-03 | End: 2023-07-08

## 2023-07-03 ASSESSMENT — PAIN - FUNCTIONAL ASSESSMENT: PAIN_FUNCTIONAL_ASSESSMENT: NONE - DENIES PAIN

## 2023-07-03 NOTE — DISCHARGE INSTRUCTIONS
Please follow up with your primary doctor. Please monitor your blood sugar at home. If your symptoms change or worsen, return to the ER as soon as possible.      1829  POCT Glucose   Collected: 07/03/23 1828  Final result  Specimen: WHOLE BLOOD     POC Glucose 232 High  mg/dL    Performed by: Barry Stacy RN          07/03/23 1757  Urinalysis with Reflex to Culture   Collected: 07/03/23 1737  Final result  Specimen: Urine     Color, UA YELLOW/STRAW      Appearance CLEAR     Specific Gravity, UA 1.006     pH, Urine 5.5     Protein, UA Negative mg/dL   Glucose, UA Negative mg/dL   Ketones, Urine Negative mg/dL   Bilirubin Urine Negative     Blood, Urine Negative     Urobilinogen, Urine 0.2 EU/dL   Nitrite, Urine Negative     Leukocyte Esterase, Urine SMALL Abnormal      Urine Culture if Indicated URINE CULTURE ORDERED Abnormal      WBC, UA 10-20 /hpf   RBC, UA 0-5 /hpf   Epithelial Cells UA MODERATE Abnormal  /lpf    BACTERIA, URINE Negative /hpf   Hyaline Casts, UA 2-5 /lpf          07/03/23 1638  Comprehensive Metabolic Panel   Collected: 07/03/23 1603  Final result  Specimen: Serum or Plasma     Sodium 141 mmol/L   Potassium 3.9 mmol/L   Chloride 107 mmol/L   CO2 28 mmol/L   Anion Gap 6 mmol/L   Glucose 98 mg/dL   BUN 18 MG/DL   Creatinine 0.85 MG/DL   Bun/Cre Ratio 21 High      Est, Glom Filt Rate >60 ml/min/1.73m2    Calcium 8.7 MG/DL   Total Bilirubin 0.2 MG/DL   ALT 24 U/L   AST 21 U/L   Alk Phosphatase 115 U/L   Total Protein 7.5 g/dL   Albumin 3.7 g/dL   Globulin 3.8 g/dL   Albumin/Globulin Ratio 1.0 Low             07/03/23 1608  CBC with Auto Differential   Collected: 07/03/23 1603  Final result  Specimen: WHOLE BLOOD     WBC 7.8 K/uL   RBC 4.52 M/uL   Hemoglobin 12.2 g/dL   Hematocrit 37.9 %   MCV 83.8 FL   MCH 27.0 PG   MCHC 32.2 g/dL   RDW 13.1 %   Platelets 068 K/uL   MPV 9.7 FL   Nucleated RBCs 0.0  WBC   nRBC 0.00 K/uL   Neutrophils % 75 %   Lymphocytes % 15 %   Monocytes % 6 %

## 2023-07-04 LAB
BACTERIA SPEC CULT: NORMAL
SERVICE CMNT-IMP: NORMAL

## 2023-08-31 RX ORDER — BLOOD SUGAR DIAGNOSTIC
STRIP MISCELLANEOUS
Qty: 400 STRIP | Refills: 3 | Status: SHIPPED | OUTPATIENT
Start: 2023-08-31

## 2023-08-31 NOTE — TELEPHONE ENCOUNTER
Last appointment: 5/8/23  Next appointment: 9/8/23  Previous refill encounter(s): 6/22/22 #150 with 11 refills    Requested Prescriptions     Pending Prescriptions Disp Refills    ONETOUCH VERIO strip [Pharmacy Med Name: ONE TOUCH VERIO TEST STR (NEW) 50] 400 strip 3     Sig: USE TO CHECK BLOOD SUGARS BEFORE BREAKFAST, LUNCH, DINNER AND BEDTIME AS DIRECTED         For Pharmacy Admin Tracking Only    Program: Medication Refill  CPA in place:    Recommendation Provided To:    Intervention Detail: New Rx: 1, reason: Patient Preference  Intervention Accepted By:   Ramila Christianson Closed?:    Time Spent (min): 5

## 2023-09-08 ENCOUNTER — OFFICE VISIT (OUTPATIENT)
Age: 48
End: 2023-09-08

## 2023-09-08 DIAGNOSIS — E10.9 TYPE 1 DIABETES MELLITUS WITHOUT COMPLICATIONS (HCC): Primary | ICD-10-CM

## 2023-09-08 DIAGNOSIS — E03.9 HYPOTHYROIDISM, UNSPECIFIED TYPE: ICD-10-CM

## 2023-09-08 DIAGNOSIS — I42.0 DILATED CARDIOMYOPATHY (HCC): ICD-10-CM

## 2023-09-08 DIAGNOSIS — I10 ESSENTIAL (PRIMARY) HYPERTENSION: ICD-10-CM

## 2023-09-14 NOTE — TELEPHONE ENCOUNTER
Last appointment: 5/8/23  Next appointment: no show 9/8/23  Previous refill encounter(s): 9/28/22 #6 with 11 refills    Requested Prescriptions     Pending Prescriptions Disp Refills    insulin glargine (LANTUS SOLOSTAR) 100 UNIT/ML injection pen [Pharmacy Med Name: Barb Alberto 100 UNIT/ML] 45 mL 3     Sig: Inject 55 Units into the skin daily         For Pharmacy Admin Tracking Only    Program: Medication Refill  CPA in place:    Recommendation Provided To:    Intervention Detail: New Rx: 1, reason: Patient Preference  Intervention Accepted By:   Chucky Strong Closed?:    Time Spent (min): 5

## 2023-09-15 RX ORDER — INSULIN GLARGINE 100 [IU]/ML
55 INJECTION, SOLUTION SUBCUTANEOUS DAILY
Qty: 45 ML | Refills: 0 | Status: SHIPPED | OUTPATIENT
Start: 2023-09-15

## 2023-09-19 ENCOUNTER — TELEPHONE (OUTPATIENT)
Age: 48
End: 2023-09-19

## 2023-09-19 DIAGNOSIS — J01.00 SUBACUTE MAXILLARY SINUSITIS: Primary | ICD-10-CM

## 2023-09-19 RX ORDER — AZITHROMYCIN 250 MG/1
250 TABLET, FILM COATED ORAL SEE ADMIN INSTRUCTIONS
Qty: 6 TABLET | Refills: 0 | Status: SHIPPED | OUTPATIENT
Start: 2023-09-19 | End: 2023-09-24

## 2023-09-19 NOTE — TELEPHONE ENCOUNTER
----- Message from Guillermo Cleveland sent at 9/19/2023 12:31 PM EDT -----  Regarding: Antibiotic  Contact: [phone]  Can I get a zpack called in to [facility]? I have been fighting a bad sinus infection since Friday and cannot get rid of it.      Thanks

## 2023-09-28 RX ORDER — BLOOD SUGAR DIAGNOSTIC
1 STRIP MISCELLANEOUS
Qty: 400 STRIP | Refills: 1 | Status: SHIPPED | OUTPATIENT
Start: 2023-09-28 | End: 2023-11-13 | Stop reason: SDUPTHER

## 2023-09-28 NOTE — TELEPHONE ENCOUNTER
Patient wants this sent to [facility]. Previous Rx was sent to [place]    Last appointment: 23  Next appointment: no show 23    Requested Prescriptions     Pending Prescriptions Disp Refills    blood glucose test strips (ONETOUCH VERIO) strip 400 strip 3     Si each by Does not apply route 4 times daily (before meals and nightly) As needed. For Pharmacy Admin Tracking Only    Program: Medication Refill  CPA in place:    Recommendation Provided To:    Intervention Detail: New Rx: 1, reason: Patient Preference  Intervention Accepted By:   Forrest Gardner Closed?:    Time Spent (min): 5

## 2023-10-11 RX ORDER — PRAVASTATIN SODIUM 40 MG
TABLET ORAL
Qty: 90 TABLET | Refills: 1 | Status: SHIPPED | OUTPATIENT
Start: 2023-10-11

## 2023-10-11 RX ORDER — INSULIN LISPRO 100 [IU]/ML
20 INJECTION, SOLUTION INTRAVENOUS; SUBCUTANEOUS
Qty: 4 EACH | Refills: 3 | Status: SHIPPED | OUTPATIENT
Start: 2023-10-11

## 2023-10-11 RX ORDER — INSULIN LISPRO 100 [IU]/ML
INJECTION, SOLUTION INTRAVENOUS; SUBCUTANEOUS
Refills: 37 | OUTPATIENT
Start: 2023-10-11

## 2023-10-11 RX ORDER — PRAVASTATIN SODIUM 40 MG
40 TABLET ORAL
Qty: 30 TABLET | OUTPATIENT
Start: 2023-10-11

## 2023-10-11 NOTE — TELEPHONE ENCOUNTER
----- Message from Ren Johnson sent at 10/11/2023  1:27 PM EDT -----  Regarding: Refill  Contact: [phone]  [facility] is trying to get refills on the below medications:    insulin lispro 100 UNIT/ML Soln injection vial  Commonly known as: HUMALOG  This prescription cannot be refilled through [facility] at this time. Pravastatin    I tried to submit a refill request but got the above message   Can these be sent to them to be filled? Thanks    Tiffany Cochran  -----------------------------------------------------------------------------------------------    Spoke to the pt and she stated she takes her insulin lispro (Humalog) this way. Inject 20 units into the skin 3 times daily (with meals)    Refills sent to Dr. Milagros Menendez for approval and will go to [facility].

## 2023-10-18 ENCOUNTER — TELEPHONE (OUTPATIENT)
Age: 48
End: 2023-10-18

## 2023-10-18 RX ORDER — INSULIN LISPRO 100 [IU]/ML
INJECTION, SOLUTION INTRAVENOUS; SUBCUTANEOUS
Qty: 5 ADJUSTABLE DOSE PRE-FILLED PEN SYRINGE | Refills: 3 | Status: SHIPPED | OUTPATIENT
Start: 2023-10-18

## 2023-10-18 NOTE — TELEPHONE ENCOUNTER
Pt changed her mind and now wants the insulin lispro (Humalog) pens and not the vial.  Pens sent to Dr. Bonnie Hutson for approval.

## 2023-10-23 ENCOUNTER — TELEPHONE (OUTPATIENT)
Age: 48
End: 2023-10-23

## 2023-10-23 NOTE — TELEPHONE ENCOUNTER
----- Message from Ran Weir sent at 10/23/2023  6:47 AM EDT -----  Regarding: Appointment  Contact: [phone]  Hi So ya,    Wanted to see if Dr Yajaira Ham has any appointments available in the next day or so. If not, maybe he can recomend something to help. I think I have inflamation in my ribs. Alot of pain in my left upper ribs that goes around my chest area and down under my arm. Coughing seems to make it worse.     Thanks    [facility]  [phone]

## 2023-10-23 NOTE — TELEPHONE ENCOUNTER
Pt having rib pain and it gets bad when coughing. She states she has been taking advil and eases up but does not go away. She denies and SOB.   She can be reached at [phone]

## 2023-10-23 NOTE — TELEPHONE ENCOUNTER
----- Message from Ajay Lerner sent at 10/23/2023  9:49 AM EDT -----  Regarding: Appointment  Contact: [phone]  I've been taking advil and it eases some but doesn't go away.

## 2023-10-26 ENCOUNTER — TELEPHONE (OUTPATIENT)
Age: 48
End: 2023-10-26

## 2023-10-26 NOTE — TELEPHONE ENCOUNTER
----- Message from Merlyn Nicolas sent at 10/26/2023  9:04 AM EDT -----  Regarding: Appointment  Contact: [phone]  Good Morning Klaudia. Dr Mar Thomas told me to call back in two days if I was still having the pain in my ribs. I have taken all of the prednisone which did help but still having pain.

## 2023-11-03 NOTE — TELEPHONE ENCOUNTER
----- Message from Vidhi Resendez sent at 11/2/2023  4:44 PM EDT -----  Subject: Refill Request    QUESTIONS  Name of Medication? escitalopram (LEXAPRO) 10 MG tablet  Patient-reported dosage and instructions? TAKE 1 TABLET BY MOUTH EVERY DAY  How many days do you have left? 0  Preferred Pharmacy? [facility] phone number (if available)? [phone]  Additional Information for Provider? last appt 05/08/23 next visit not   scheduled  ---------------------------------------------------------------------------  --------------  CALL BACK INFO  What is the best way for the office to contact you? OK to leave message on   voicemail,Do not leave any message, patient will call back for answer,OK   to respond with electronic message via [employer] portal (only for patients   who have registered [employer] account)  Preferred Call Back Phone Number? [phone]  ---------------------------------------------------------------------------  --------------  SCRIPT ANSWERS  Relationship to Patient? Covered Entity  Covered Entity Type? Pharmacy? Representative Name?  Abdiel Morgan

## 2023-11-03 NOTE — TELEPHONE ENCOUNTER
Last appointment: 5/8/23  Next appointment: no show 9/8/23  Previous refill encounter(s): 4/18/23 #90 with 2 refills    Requested Prescriptions     Pending Prescriptions Disp Refills    escitalopram (LEXAPRO) 10 MG tablet [Pharmacy Med Name: ESCITALOPRAM 10 MG TABLET] 90 tablet 0     Sig: Take 1 tablet by mouth daily         For Pharmacy Admin Tracking Only    Program: Medication Refill  CPA in place:    Recommendation Provided To:    Intervention Detail: New Rx: 1, reason: Patient Preference  Intervention Accepted By:   Carl Perez Closed?:    Time Spent (min): 5

## 2023-11-06 RX ORDER — ESCITALOPRAM OXALATE 10 MG/1
10 TABLET ORAL DAILY
Qty: 90 TABLET | Refills: 0 | Status: SHIPPED | OUTPATIENT
Start: 2023-11-06

## 2023-11-08 ENCOUNTER — TELEPHONE (OUTPATIENT)
Age: 48
End: 2023-11-08

## 2023-11-08 DIAGNOSIS — J01.00 SUBACUTE MAXILLARY SINUSITIS: Primary | ICD-10-CM

## 2023-11-08 RX ORDER — GUAIFENESIN AND DEXTROMETHORPHAN HYDROBROMIDE 600; 30 MG/1; MG/1
1 TABLET, EXTENDED RELEASE ORAL 2 TIMES DAILY PRN
Qty: 20 TABLET | Refills: 2 | Status: SHIPPED | OUTPATIENT
Start: 2023-11-08

## 2023-11-08 RX ORDER — AMOXICILLIN 500 MG/1
500 CAPSULE ORAL 3 TIMES DAILY
Qty: 21 CAPSULE | Refills: 0 | Status: SHIPPED | OUTPATIENT
Start: 2023-11-08 | End: 2023-11-15

## 2023-11-08 NOTE — TELEPHONE ENCOUNTER
----- Message from Rosalinda Neumann sent at 11/8/2023  1:47 PM EST -----  Regarding: Appointment/Medicine/Note  Contact: [phone]    Lucio Rudolph,  I now have whatever Angelique Tillman has. Fever, headache, dry cough, nasal congestion, sneezing, body aches, etc.  Just feeling bad. I can come in if needed but wanted to see if I could get some meds and a note for work.      Thanks,     Khushbu

## 2023-11-09 ENCOUNTER — CLINICAL DOCUMENTATION (OUTPATIENT)
Age: 48
End: 2023-11-09

## 2023-11-13 DIAGNOSIS — I10 ESSENTIAL (PRIMARY) HYPERTENSION: ICD-10-CM

## 2023-11-13 RX ORDER — BLOOD-GLUCOSE METER
1 EACH MISCELLANEOUS
Qty: 400 STRIP | Refills: 1 | Status: SHIPPED | OUTPATIENT
Start: 2023-11-13

## 2023-11-13 RX ORDER — INSULIN LISPRO 100 [IU]/ML
20 INJECTION, SOLUTION INTRAVENOUS; SUBCUTANEOUS
Qty: 60 ML | Refills: 1 | Status: SHIPPED | OUTPATIENT
Start: 2023-11-13

## 2023-11-13 RX ORDER — METOPROLOL TARTRATE 50 MG/1
50 TABLET, FILM COATED ORAL EVERY EVENING
Qty: 90 TABLET | Refills: 1 | Status: SHIPPED | OUTPATIENT
Start: 2023-11-13

## 2023-11-13 RX ORDER — ESCITALOPRAM OXALATE 10 MG/1
10 TABLET ORAL DAILY
Qty: 90 TABLET | Refills: 1 | Status: SHIPPED | OUTPATIENT
Start: 2023-11-13

## 2023-11-13 RX ORDER — PRAVASTATIN SODIUM 40 MG
TABLET ORAL
Qty: 90 TABLET | Refills: 1 | Status: SHIPPED | OUTPATIENT
Start: 2023-11-13

## 2023-11-13 NOTE — TELEPHONE ENCOUNTER
[facility] is requesting a 90 day supply  Previous Rx's were sent to [facility]    Last appointment: 23  Next appointment: no show 23    Requested Prescriptions     Pending Prescriptions Disp Refills    metoprolol tartrate (LOPRESSOR) 50 MG tablet 90 tablet 1     Sig: Take 1 tablet by mouth every evening    escitalopram (LEXAPRO) 10 MG tablet 90 tablet 1     Sig: Take 1 tablet by mouth daily    pravastatin (PRAVACHOL) 40 MG tablet 90 tablet 1     Sig: Take 1 Tablet by mouth nightly. Insulin Pen Needle 31G X 5 MM MISC 100 each 3     Si each by Does not apply route daily    blood glucose test strips (ONETOUCH VERIO) strip 400 strip 1     Si each by Does not apply route 4 times daily (before meals and nightly) As needed. insulin lispro (HUMALOG) 100 UNIT/ML SOLN injection vial 60 mL 1     Sig: Inject 20 Units into the skin 3 times daily (with meals)         For Pharmacy Admin Tracking Only    Program: Medication Refill  CPA in place:    Recommendation Provided To:    Intervention Detail: New Rx: 6, reason: Patient Preference  Intervention Accepted By:   Sarah Whyte Closed?:    Time Spent (min): 5

## 2023-11-24 RX ORDER — FLURBIPROFEN SODIUM 0.3 MG/ML
SOLUTION/ DROPS OPHTHALMIC
Qty: 100 EACH | Refills: 3 | Status: SHIPPED | OUTPATIENT
Start: 2023-11-24

## 2023-11-24 NOTE — TELEPHONE ENCOUNTER
Last appointment: 5/8/23  Next appointment: no show 9/5/23    Requested Prescriptions     Pending Prescriptions Disp Refills    Insulin Pen Needle (B-D UF III MINI PEN NEEDLES) 31G X 5 MM MISC 100 each 3     Sig: Use once per day with Lantus         For Pharmacy Admin Tracking Only    Program: Medication Refill  CPA in place:    Recommendation Provided To:    Intervention Detail: New Rx: 1, reason: Patient Preference  Intervention Accepted By:   Eleanor Aguilar Closed?:    Time Spent (min): 5

## 2023-11-29 ENCOUNTER — TELEPHONE (OUTPATIENT)
Age: 48
End: 2023-11-29

## 2023-11-29 DIAGNOSIS — E10.9 TYPE 1 DIABETES MELLITUS WITHOUT COMPLICATION (HCC): Primary | ICD-10-CM

## 2023-11-29 RX ORDER — INSULIN GLARGINE 100 [IU]/ML
48 INJECTION, SOLUTION SUBCUTANEOUS NIGHTLY
Qty: 5 ADJUSTABLE DOSE PRE-FILLED PEN SYRINGE | Refills: 3 | Status: SHIPPED | OUTPATIENT
Start: 2023-11-29 | End: 2023-11-30 | Stop reason: SDUPTHER

## 2023-11-29 NOTE — TELEPHONE ENCOUNTER
----- Message from Kenrick Quinonez sent at 11/29/2023 10:21 AM EST -----  Regarding: Lantus Insulin  Contact: [phone]  Good Morning. My new insurance is not covering the Lantus Insulin. Here is what they have recommended:    Alternative options for Lantus Solostar. Based on my coverage check, the recommended alternative medications are as follows: Semglee or  Satnam Whittington     Can Dr Jayde Castano send a new prescription for one of these to [facility] so I can get it filled? The vials are fine as I take this at night so I do not need it in the pens. I would need some syringes if he calls the vials in. Let me know if you have any questions. Thanks for all of your help.     Ze Myers  [phone]

## 2023-11-30 ENCOUNTER — TELEPHONE (OUTPATIENT)
Age: 48
End: 2023-11-30

## 2023-11-30 DIAGNOSIS — E10.9 TYPE 1 DIABETES MELLITUS WITHOUT COMPLICATION (HCC): ICD-10-CM

## 2023-11-30 RX ORDER — INSULIN GLARGINE 100 [IU]/ML
48 INJECTION, SOLUTION SUBCUTANEOUS NIGHTLY
Qty: 5 ADJUSTABLE DOSE PRE-FILLED PEN SYRINGE | Refills: 3 | Status: SHIPPED | OUTPATIENT
Start: 2023-11-30

## 2023-11-30 NOTE — TELEPHONE ENCOUNTER
----- Message from Jose Maria Brandt sent at 11/30/2023 10:29 AM EST -----  Regarding: Lantus Insulin  Contact: [phone]  Albert Thurman,    Dr Dianelys Ruiz sent the new insulin prescription to [facility] instead of [facility]. Can you make sure it gets sent to [facility]? Suzanne Garg  ------------------------------------------------------------------------------------------------------    Rx sent to Dr. Dianelys Ruiz to sent to [facility] per the pt. Called [facility] and cancelled the [address]

## 2023-11-30 NOTE — TELEPHONE ENCOUNTER
----- Message from Jose Maria Brandt sent at 11/30/2023 10:29 AM EST -----  Regarding: Lantus Insulin  Contact: [phone]  Dr Dianelys Campuzano sent the new insulin prescription to [facility] instead of [facility]. Can you make sure it gets sent to [facility]?     Thanks    Konrad Garg

## 2024-03-29 ENCOUNTER — E-VISIT (OUTPATIENT)
Age: 49
End: 2024-03-29

## 2024-03-29 DIAGNOSIS — J01.00 SUBACUTE MAXILLARY SINUSITIS: Primary | ICD-10-CM

## 2024-03-29 DIAGNOSIS — E10.9 TYPE 1 DIABETES MELLITUS WITHOUT COMPLICATION (HCC): ICD-10-CM

## 2024-03-29 DIAGNOSIS — I10 ESSENTIAL (PRIMARY) HYPERTENSION: ICD-10-CM

## 2024-03-29 RX ORDER — AMOXICILLIN 500 MG/1
500 CAPSULE ORAL 3 TIMES DAILY
Qty: 21 CAPSULE | Refills: 0 | Status: SHIPPED | OUTPATIENT
Start: 2024-03-29 | End: 2024-04-05

## 2024-03-29 RX ORDER — GUAIFENESIN AND DEXTROMETHORPHAN HYDROBROMIDE 600; 30 MG/1; MG/1
1 TABLET, EXTENDED RELEASE ORAL 2 TIMES DAILY PRN
Qty: 20 TABLET | Refills: 2 | Status: SHIPPED | OUTPATIENT
Start: 2024-03-29

## 2024-03-29 ASSESSMENT — LIFESTYLE VARIABLES: SMOKING_STATUS: NO, I'VE NEVER SMOKED

## 2024-03-29 NOTE — PROGRESS NOTES
C/o sinus congestion,cough and sputa production x 3 days,worsening.Will soon do covid test.No fever or chills  Assement : maxillary Sinusitis  Plan: rxs for amoxicillin and Mucinex DM sent to pharmacy

## 2024-04-01 ENCOUNTER — TELEPHONE (OUTPATIENT)
Age: 49
End: 2024-04-01

## 2024-04-01 NOTE — TELEPHONE ENCOUNTER
----- Message from Sophia Dominguez sent at 4/1/2024 10:00 AM EDT -----  Regarding: Covid  Contact: [phone]  Just wanted to see if I need to change any of the meds I was given last week as Sylvia Dominguez and I both have tested positive for Covid.  Anything that will help please let me know. I may also need a doctors note of when I can return to the office to work.     Thanks    Sophia Dominguez  [phone]

## 2024-05-06 RX ORDER — PRAVASTATIN SODIUM 40 MG
TABLET ORAL
Qty: 90 TABLET | Refills: 0 | Status: SHIPPED | OUTPATIENT
Start: 2024-05-06

## 2024-05-06 NOTE — TELEPHONE ENCOUNTER
Last appointment: 5/8/23  Next appointment: none  Previous refill encounter(s): 11/13/23 #90 with 1 refill    Requested Prescriptions     Pending Prescriptions Disp Refills    pravastatin (PRAVACHOL) 40 MG tablet [Pharmacy Med Name: PRAVASTATIN TABS 40MG] 90 tablet 0     Sig: TAKE 1 TABLET NIGHTLY         For Pharmacy Admin Tracking Only    Program: Medication Refill  CPA in place:    Recommendation Provided To:   Intervention Detail: New Rx: 1, reason: Patient Preference  Intervention Accepted By:   Gap Closed?:    Time Spent (min): 5

## 2024-05-10 DIAGNOSIS — I10 ESSENTIAL (PRIMARY) HYPERTENSION: ICD-10-CM

## 2024-05-10 RX ORDER — METOPROLOL TARTRATE 50 MG/1
50 TABLET, FILM COATED ORAL EVERY EVENING
Qty: 90 TABLET | Refills: 2 | Status: SHIPPED | OUTPATIENT
Start: 2024-05-10

## 2024-05-10 RX ORDER — ESCITALOPRAM OXALATE 10 MG/1
10 TABLET ORAL DAILY
Qty: 90 TABLET | Refills: 2 | Status: SHIPPED | OUTPATIENT
Start: 2024-05-10

## 2024-05-10 RX ORDER — BLOOD SUGAR DIAGNOSTIC
STRIP MISCELLANEOUS
Qty: 400 STRIP | Refills: 2 | Status: SHIPPED | OUTPATIENT
Start: 2024-05-10

## 2024-05-10 NOTE — TELEPHONE ENCOUNTER
Last appointment: 5/8/23  Next appointment: no show 9/8/23  Previous refill encounter(s): 11/13/23 90 d/s with 1 refills    Requested Prescriptions     Pending Prescriptions Disp Refills    metoprolol tartrate (LOPRESSOR) 50 MG tablet [Pharmacy Med Name: METOPROLOL TARTRATE TABS 50MG] 90 tablet 0     Sig: Take 1 tablet by mouth every evening Patient needs an appointment for further refills    ONETOUCH VERIO strip [Pharmacy Med Name: ONE TOUCH VERIO STRIPS 100'S] 400 strip 0     Sig: USE 1 STRIP FOUR TIMES A DAY (BEFORE MEALS AND NIGHTLY) AS NEEDED    escitalopram (LEXAPRO) 10 MG tablet [Pharmacy Med Name: ESCITALOPRAM TABS 10MG] 90 tablet 0     Sig: Take 1 tablet by mouth daily Patient needs an appointment for further refills         For Pharmacy Admin Tracking Only    Program: Medication Refill  CPA in place:    Recommendation Provided To:   Intervention Detail: New Rx: 3, reason: Patient Preference  Intervention Accepted By:   Gap Closed?:    Time Spent (min): 5

## 2024-07-11 ENCOUNTER — TELEPHONE (OUTPATIENT)
Age: 49
End: 2024-07-11

## 2024-07-11 NOTE — TELEPHONE ENCOUNTER
Pt  negative for COVID.  ------------------------------------------------------------------  Sophia SHARMA [facility] Clinical Staff (supporting Talat Macias MD)2 hours ago (7:27 AM)       Wanted to see if there was something I could get to help with covid symptoms. Unsure if it is covid or something else as  I did an at home test yesterday and it came back negative. I have the following symptoms: sore throat, nasal congestion, vomiting diarrhea, fever, muscle aches. I have not been able to eat since Monday. Just feeling pretty bad.     Thanks     Sophia Dominguez  [phone]  ------------------------------------------------------------------    ---- Message from Sophia Dominguez sent at 7/11/2024  9:42 AM EDT -----  Regarding: Covid?  Contact: [phone]  Yes it came back negative. Maybe the flu again?

## 2024-07-12 DIAGNOSIS — J01.00 SUBACUTE MAXILLARY SINUSITIS: Primary | ICD-10-CM

## 2024-07-12 RX ORDER — AMOXICILLIN 500 MG/1
500 CAPSULE ORAL 3 TIMES DAILY
Qty: 21 CAPSULE | Refills: 0 | Status: SHIPPED | OUTPATIENT
Start: 2024-07-12 | End: 2024-07-19

## 2024-08-08 RX ORDER — PRAVASTATIN SODIUM 40 MG
40 TABLET ORAL NIGHTLY
Qty: 30 TABLET | Refills: 0 | Status: SHIPPED | OUTPATIENT
Start: 2024-08-08

## 2024-08-08 NOTE — TELEPHONE ENCOUNTER
A I'mOK message has been sent to the patient advising them to schedule an appt.    Last appointment: 5/8/23  Next appointment: none  Previous refill encounter(s): 5/6/24 #90    Requested Prescriptions     Pending Prescriptions Disp Refills    pravastatin (PRAVACHOL) 40 MG tablet [Pharmacy Med Name: PRAVASTATIN TABS 40MG] 90 tablet 0     Sig: Take 1 tablet by mouth nightly Patient needs an appointment for further refills. Last appt >1 year ago.         For Pharmacy Admin Tracking Only    Program: Medication Refill  CPA in place:    Recommendation Provided To:   Intervention Detail: New Rx: 1, reason: Patient Preference  Intervention Accepted By:   Gap Closed?:    Time Spent (min): 5

## 2024-08-13 RX ORDER — INSULIN LISPRO 100 [IU]/ML
INJECTION, SOLUTION INTRAVENOUS; SUBCUTANEOUS
Qty: 60 ML | Refills: 0 | Status: SHIPPED | OUTPATIENT
Start: 2024-08-13

## 2024-08-13 NOTE — TELEPHONE ENCOUNTER
Last appointment: 5/8/23  Next appointment: none  Previous refill encounter(s): 10/18/23 #5 with 3 refills    Requested Prescriptions     Pending Prescriptions Disp Refills    insulin lispro, 1 Unit Dial, (HUMALOG KWIKPEN) 100 UNIT/ML SOPN [Pharmacy Med Name: HUMALOG KWIKPEN U100 3ML 5'S 100U/ML] 60 mL 0     Sig: INJECT 20 UNITS UNDER THE SKIN THREE TIMES A DAY WITH MEALS         For Pharmacy Admin Tracking Only    Program: Medication Refill  CPA in place:    Recommendation Provided To:   Intervention Detail: New Rx: 1, reason: Patient Preference  Intervention Accepted By:   Gap Closed?:    Time Spent (min): 5

## 2024-11-06 RX ORDER — FLURBIPROFEN SODIUM 0.3 MG/ML
SOLUTION/ DROPS OPHTHALMIC
Qty: 100 EACH | Refills: 0 | Status: SHIPPED | OUTPATIENT
Start: 2024-11-06

## 2024-11-06 NOTE — TELEPHONE ENCOUNTER
Last appointment: 9/8/23  Next appointment: 12/31/24  Previous refill encounter(s): 11/13/23    Requested Prescriptions     Pending Prescriptions Disp Refills    Insulin Pen Needle (B-D UF III MINI PEN NEEDLES) 31G X 5 MM MISC [Pharmacy Med Name: BD PEN LUZ UF MINI 5MM 31G3/16] 100 each 3     Sig: USE ONCE PER DAY WITH LANTUS         For Pharmacy Admin Tracking Only    Program: Medication Refill  CPA in place:    Recommendation Provided To:   Intervention Detail: New Rx: 1, reason: Patient Preference  Intervention Accepted By:   Gap Closed?:    Time Spent (min): 5

## 2024-11-08 ENCOUNTER — TELEPHONE (OUTPATIENT)
Age: 49
End: 2024-11-08

## 2024-11-08 NOTE — TELEPHONE ENCOUNTER
Patient wants to know if you would call something in for a ear infection, she said she has ear pain & headache.  Please give her a call @ [phone]

## 2024-11-11 DIAGNOSIS — E10.9 TYPE 1 DIABETES MELLITUS WITHOUT COMPLICATION (HCC): ICD-10-CM

## 2024-11-11 NOTE — TELEPHONE ENCOUNTER
Patient complains of sinus/cold symptoms    Symptoms started on Tuesday (last week) and I have been taking Adwoa [place] Sinus since with no relief.      Thanks,     Sophia Dominguez

## 2024-11-12 RX ORDER — INSULIN GLARGINE 100 [IU]/ML
INJECTION, SOLUTION SUBCUTANEOUS
Qty: 45 ML | Refills: 0 | Status: SHIPPED | OUTPATIENT
Start: 2024-11-12

## 2024-11-18 RX ORDER — INSULIN LISPRO 100 [IU]/ML
INJECTION, SOLUTION INTRAVENOUS; SUBCUTANEOUS
Qty: 60 ML | Refills: 0 | Status: SHIPPED | OUTPATIENT
Start: 2024-11-18

## 2024-11-18 RX ORDER — LEVOTHYROXINE SODIUM 137 UG/1
137 TABLET ORAL
Qty: 90 TABLET | Refills: 0 | Status: SHIPPED | OUTPATIENT
Start: 2024-11-18

## 2024-11-18 NOTE — TELEPHONE ENCOUNTER
Patient Comment: Please send the refill to [facility]. They said they have been trying to get this for several weeks. Please send asap as I am almost out. I also need the generic Synthroid sent to them. I have been out of that for several months.     Last appointment: 9/8/23  Next appointment: 12/31/24  Previous refill encounter(s): 8/13/24 Humalog    Requested Prescriptions     Pending Prescriptions Disp Refills    HUMALOG KWIKPEN 100 UNIT/ML SOPN [Pharmacy Med Name: HUMALOG KWIKPEN U100 3ML 5'S 100U/ML] 60 mL 3     Sig: INJECT 20 UNITS UNDER THE SKIN THREE TIMES A DAY WITH MEALS (NEED AN APPOINTMENT FOR FURTHER REFILLS, LAST APPOINTMENT MORE THAN 1 YEAR AGO)    levothyroxine (SYNTHROID) 137 MCG tablet 90 tablet 0     Sig: Take 1 tablet by mouth every morning (before breakfast)         For Pharmacy Admin Tracking Only    Program: Medication Refill  CPA in place:    Recommendation Provided To:   Intervention Detail: New Rx: 2, reason: Patient Preference  Intervention Accepted By:   Gap Closed?:    Time Spent (min): 5

## 2024-11-19 RX ORDER — INSULIN LISPRO 100 [IU]/ML
INJECTION, SOLUTION INTRAVENOUS; SUBCUTANEOUS
Qty: 60 ML | Refills: 0 | OUTPATIENT
Start: 2024-11-19

## 2024-11-19 NOTE — TELEPHONE ENCOUNTER
Duplicate    For Pharmacy Admin Tracking Only    Program: Medication Refill  CPA in place:    Recommendation Provided To:   Intervention Detail: Discontinued Rx: 2, reason: Duplicate Therapy  Intervention Accepted By:   Gap Closed?:    Time Spent (min): 5

## 2025-02-04 DIAGNOSIS — I10 ESSENTIAL (PRIMARY) HYPERTENSION: ICD-10-CM

## 2025-02-04 RX ORDER — ESCITALOPRAM OXALATE 10 MG/1
TABLET ORAL
Qty: 90 TABLET | Refills: 3 | OUTPATIENT
Start: 2025-02-04

## 2025-02-04 RX ORDER — METOPROLOL TARTRATE 50 MG
TABLET ORAL
Qty: 90 TABLET | Refills: 3 | OUTPATIENT
Start: 2025-02-04

## 2025-02-04 RX ORDER — BLOOD SUGAR DIAGNOSTIC
STRIP MISCELLANEOUS
Qty: 400 STRIP | Refills: 3 | OUTPATIENT
Start: 2025-02-04

## 2025-02-04 NOTE — TELEPHONE ENCOUNTER
Pt has a new PCP per notes    For Pharmacy Admin Tracking Only    Program: Medication Refill  CPA in place:    Recommendation Provided To:   Intervention Detail: Discontinued Rx: 3, reason: Non-adherence  Intervention Accepted By:   Gap Closed?:    Time Spent (min): 5

## 2025-02-11 RX ORDER — INSULIN LISPRO 100 [IU]/ML
INJECTION, SOLUTION INTRAVENOUS; SUBCUTANEOUS
Qty: 60 ML | Refills: 3 | OUTPATIENT
Start: 2025-02-11

## 2025-02-11 NOTE — TELEPHONE ENCOUNTER
Pt has a new PCP per notes     For Pharmacy Admin Tracking Only    Program: Medication Refill  CPA in place:    Recommendation Provided To:   Intervention Detail: Discontinued Rx: 1, reason: Non-adherence  Intervention Accepted By:   Gap Closed?:    Time Spent (min): 5